# Patient Record
Sex: MALE | Race: BLACK OR AFRICAN AMERICAN | Employment: OTHER | URBAN - METROPOLITAN AREA
[De-identification: names, ages, dates, MRNs, and addresses within clinical notes are randomized per-mention and may not be internally consistent; named-entity substitution may affect disease eponyms.]

---

## 2019-07-05 ENCOUNTER — HOSPITAL ENCOUNTER (EMERGENCY)
Age: 84
Discharge: HOME | End: 2019-07-05
Attending: EMERGENCY MEDICINE
Payer: MEDICARE

## 2019-07-05 ENCOUNTER — APPOINTMENT (OUTPATIENT)
Dept: CT IMAGING | Age: 84
End: 2019-07-05
Payer: MEDICARE

## 2019-07-05 VITALS
HEART RATE: 71 BPM | WEIGHT: 174 LBS | SYSTOLIC BLOOD PRESSURE: 153 MMHG | TEMPERATURE: 98.1 F | RESPIRATION RATE: 19 BRPM | OXYGEN SATURATION: 97 % | DIASTOLIC BLOOD PRESSURE: 73 MMHG

## 2019-07-05 DIAGNOSIS — T67.5XXA HEAT EXHAUSTION, INITIAL ENCOUNTER: Primary | ICD-10-CM

## 2019-07-05 LAB
ALBUMIN SERPL-MCNC: 4 G/DL (ref 3.5–4.6)
ALP BLD-CCNC: 59 U/L (ref 35–104)
ALT SERPL-CCNC: 7 U/L (ref 0–41)
ANION GAP SERPL CALCULATED.3IONS-SCNC: 15 MEQ/L (ref 9–15)
AST SERPL-CCNC: 13 U/L (ref 0–40)
BASOPHILS ABSOLUTE: 0 K/UL (ref 0–0.2)
BASOPHILS RELATIVE PERCENT: 0.2 %
BILIRUB SERPL-MCNC: 0.3 MG/DL (ref 0.2–0.7)
BUN BLDV-MCNC: 15 MG/DL (ref 8–23)
CALCIUM SERPL-MCNC: 8.5 MG/DL (ref 8.5–9.9)
CHLORIDE BLD-SCNC: 104 MEQ/L (ref 95–107)
CO2: 22 MEQ/L (ref 20–31)
CREAT SERPL-MCNC: 1.16 MG/DL (ref 0.7–1.2)
EKG ATRIAL RATE: 71 BPM
EKG P AXIS: 56 DEGREES
EKG P-R INTERVAL: 206 MS
EKG Q-T INTERVAL: 418 MS
EKG QRS DURATION: 98 MS
EKG QTC CALCULATION (BAZETT): 454 MS
EKG R AXIS: 40 DEGREES
EKG T AXIS: 45 DEGREES
EKG VENTRICULAR RATE: 71 BPM
EOSINOPHILS ABSOLUTE: 0.3 K/UL (ref 0–0.7)
EOSINOPHILS RELATIVE PERCENT: 5.7 %
GFR AFRICAN AMERICAN: >60
GFR NON-AFRICAN AMERICAN: 59.7
GLOBULIN: 3.9 G/DL (ref 2.3–3.5)
GLUCOSE BLD-MCNC: 111 MG/DL (ref 70–99)
HCT VFR BLD CALC: 33.4 % (ref 42–52)
HEMOGLOBIN: 11.1 G/DL (ref 14–18)
INR BLD: 1.1
LYMPHOCYTES ABSOLUTE: 1.5 K/UL (ref 1–4.8)
LYMPHOCYTES RELATIVE PERCENT: 27.4 %
MCH RBC QN AUTO: 30.5 PG (ref 27–31.3)
MCHC RBC AUTO-ENTMCNC: 33.4 % (ref 33–37)
MCV RBC AUTO: 91.3 FL (ref 80–100)
MONOCYTES ABSOLUTE: 0.5 K/UL (ref 0.2–0.8)
MONOCYTES RELATIVE PERCENT: 9.6 %
NEUTROPHILS ABSOLUTE: 3.2 K/UL (ref 1.4–6.5)
NEUTROPHILS RELATIVE PERCENT: 57.1 %
PDW BLD-RTO: 14 % (ref 11.5–14.5)
PLATELET # BLD: 243 K/UL (ref 130–400)
POTASSIUM SERPL-SCNC: 3.6 MEQ/L (ref 3.4–4.9)
PROTHROMBIN TIME: 11.1 SEC (ref 9–11.5)
RBC # BLD: 3.66 M/UL (ref 4.7–6.1)
SODIUM BLD-SCNC: 141 MEQ/L (ref 135–144)
TOTAL PROTEIN: 7.9 G/DL (ref 6.3–8)
WBC # BLD: 5.7 K/UL (ref 4.8–10.8)

## 2019-07-05 PROCEDURE — 96375 TX/PRO/DX INJ NEW DRUG ADDON: CPT

## 2019-07-05 PROCEDURE — 85025 COMPLETE CBC W/AUTO DIFF WBC: CPT

## 2019-07-05 PROCEDURE — 85610 PROTHROMBIN TIME: CPT

## 2019-07-05 PROCEDURE — 99285 EMERGENCY DEPT VISIT HI MDM: CPT

## 2019-07-05 PROCEDURE — 36415 COLL VENOUS BLD VENIPUNCTURE: CPT

## 2019-07-05 PROCEDURE — 80053 COMPREHEN METABOLIC PANEL: CPT

## 2019-07-05 PROCEDURE — 6360000002 HC RX W HCPCS: Performed by: EMERGENCY MEDICINE

## 2019-07-05 PROCEDURE — 2580000003 HC RX 258: Performed by: EMERGENCY MEDICINE

## 2019-07-05 PROCEDURE — 96374 THER/PROPH/DIAG INJ IV PUSH: CPT

## 2019-07-05 PROCEDURE — 70450 CT HEAD/BRAIN W/O DYE: CPT

## 2019-07-05 PROCEDURE — 93005 ELECTROCARDIOGRAM TRACING: CPT | Performed by: EMERGENCY MEDICINE

## 2019-07-05 PROCEDURE — 93010 ELECTROCARDIOGRAM REPORT: CPT | Performed by: INTERNAL MEDICINE

## 2019-07-05 RX ORDER — LORAZEPAM 2 MG/ML
1 INJECTION INTRAMUSCULAR ONCE
Status: COMPLETED | OUTPATIENT
Start: 2019-07-05 | End: 2019-07-05

## 2019-07-05 RX ORDER — KETOROLAC TROMETHAMINE 15 MG/ML
15 INJECTION, SOLUTION INTRAMUSCULAR; INTRAVENOUS ONCE
Status: COMPLETED | OUTPATIENT
Start: 2019-07-05 | End: 2019-07-05

## 2019-07-05 RX ORDER — 0.9 % SODIUM CHLORIDE 0.9 %
1000 INTRAVENOUS SOLUTION INTRAVENOUS ONCE
Status: COMPLETED | OUTPATIENT
Start: 2019-07-05 | End: 2019-07-05

## 2019-07-05 RX ADMIN — SODIUM CHLORIDE 1000 ML: 9 INJECTION, SOLUTION INTRAVENOUS at 01:49

## 2019-07-05 RX ADMIN — KETOROLAC TROMETHAMINE 15 MG: 15 INJECTION, SOLUTION INTRAMUSCULAR; INTRAVENOUS at 01:49

## 2019-07-05 RX ADMIN — LORAZEPAM 1 MG: 2 INJECTION INTRAMUSCULAR; INTRAVENOUS at 01:49

## 2019-07-05 SDOH — HEALTH STABILITY: MENTAL HEALTH: HOW OFTEN DO YOU HAVE A DRINK CONTAINING ALCOHOL?: NEVER

## 2019-07-05 ASSESSMENT — ENCOUNTER SYMPTOMS
COUGH: 0
VOMITING: 0
SORE THROAT: 0
SHORTNESS OF BREATH: 0
WHEEZING: 0
ABDOMINAL DISTENTION: 0
CHEST TIGHTNESS: 0
PHOTOPHOBIA: 0
ABDOMINAL PAIN: 0
EYE DISCHARGE: 0

## 2019-07-05 ASSESSMENT — PAIN SCALES - GENERAL: PAINLEVEL_OUTOF10: 3

## 2019-07-05 NOTE — ED PROVIDER NOTES
COMPREHENSIVE METABOLIC PANEL - Abnormal; Notable for the following components:       Result Value    Glucose 111 (*)     GFR Non- 59.7 (*)     Globulin 3.9 (*)     All other components within normal limits   CBC WITH AUTO DIFFERENTIAL - Abnormal; Notable for the following components:    RBC 3.66 (*)     Hemoglobin 11.1 (*)     Hematocrit 33.4 (*)     All other components within normal limits   PROTIME-INR   URINE RT REFLEX TO CULTURE       All other labs were within normal range or not returned as of this dictation. EMERGENCY DEPARTMENT COURSE and DIFFERENTIAL DIAGNOSIS/MDM:   Vitals:    Vitals:    07/05/19 0017 07/05/19 0023 07/05/19 0130   BP:  (!) 184/90 (!) 161/83   Pulse:  80 65   Resp:  17 16   Temp:  98.1 °F (36.7 °C)    TempSrc:  Oral    SpO2:  99% 97%   Weight: 174 lb (78.9 kg)          MDM patient has had a completely normal neurologic exam while here. He has been appropriate according to his girlfriend. It appears that he had a level of heat exhaustion leading to the episode he had while out tonight. This was substantially longer time outside today and it was very hot humid today. His head CT was normal.  He is going to follow-up with his family doctor return here for any recurring symptoms. CONSULTS:  None    PROCEDURES:  Unless otherwise noted below, none     Procedures    FINAL IMPRESSION      1.  Heat exhaustion, initial encounter          DISPOSITION/PLAN   DISPOSITION Decision To Discharge 07/05/2019 02:56:14 AM      PATIENT REFERRED TO:  Family Doctor    In 3 days        DISCHARGE MEDICATIONS:  New Prescriptions    No medications on file          (Please note that portions of this note were completed with a voice recognition program.  Efforts were made to edit the dictations but occasionally words are mis-transcribed.)    Annabella Humphrey MD (electronically signed)  Attending Emergency Physician          Annabella Humphrey MD  07/05/19 [id number]

## 2020-11-30 ENCOUNTER — HOSPITAL ENCOUNTER (INPATIENT)
Age: 85
LOS: 3 days | Discharge: PSYCHIATRIC OR SUBSTANCE USE FACILITY | DRG: 057 | End: 2020-12-04
Attending: EMERGENCY MEDICINE | Admitting: INTERNAL MEDICINE
Payer: MEDICARE

## 2020-11-30 ENCOUNTER — APPOINTMENT (OUTPATIENT)
Dept: MRI IMAGING | Age: 85
DRG: 057 | End: 2020-11-30
Payer: MEDICARE

## 2020-11-30 ENCOUNTER — APPOINTMENT (OUTPATIENT)
Dept: GENERAL RADIOLOGY | Age: 85
DRG: 057 | End: 2020-11-30
Payer: MEDICARE

## 2020-11-30 ENCOUNTER — APPOINTMENT (OUTPATIENT)
Dept: CT IMAGING | Age: 85
DRG: 057 | End: 2020-11-30
Payer: MEDICARE

## 2020-11-30 PROBLEM — R41.82 AMS (ALTERED MENTAL STATUS): Status: ACTIVE | Noted: 2020-11-30

## 2020-11-30 LAB
ALBUMIN SERPL-MCNC: 4.5 G/DL (ref 3.5–4.6)
ALP BLD-CCNC: 57 U/L (ref 35–104)
ALT SERPL-CCNC: 7 U/L (ref 0–41)
AMPHETAMINE SCREEN, URINE: NORMAL
ANION GAP SERPL CALCULATED.3IONS-SCNC: 14 MEQ/L (ref 9–15)
ANION GAP SERPL CALCULATED.3IONS-SCNC: 17 MEQ/L (ref 9–15)
AST SERPL-CCNC: 18 U/L (ref 0–40)
BACTERIA: NEGATIVE /HPF
BARBITURATE SCREEN URINE: NORMAL
BASOPHILS ABSOLUTE: 0 K/UL (ref 0–0.2)
BASOPHILS ABSOLUTE: 0.1 K/UL (ref 0–0.2)
BASOPHILS RELATIVE PERCENT: 0.6 %
BASOPHILS RELATIVE PERCENT: 0.9 %
BENZODIAZEPINE SCREEN, URINE: NORMAL
BILIRUB SERPL-MCNC: 0.5 MG/DL (ref 0.2–0.7)
BILIRUBIN URINE: NEGATIVE
BLOOD, URINE: ABNORMAL
BUN BLDV-MCNC: 26 MG/DL (ref 8–23)
BUN BLDV-MCNC: 29 MG/DL (ref 8–23)
CALCIUM SERPL-MCNC: 8.7 MG/DL (ref 8.5–9.9)
CALCIUM SERPL-MCNC: 9.1 MG/DL (ref 8.5–9.9)
CANNABINOID SCREEN URINE: NORMAL
CHLORIDE BLD-SCNC: 106 MEQ/L (ref 95–107)
CHLORIDE BLD-SCNC: 108 MEQ/L (ref 95–107)
CHOLESTEROL, TOTAL: 175 MG/DL (ref 0–199)
CLARITY: CLEAR
CO2: 17 MEQ/L (ref 20–31)
CO2: 22 MEQ/L (ref 20–31)
COCAINE METABOLITE SCREEN URINE: NORMAL
COLOR: YELLOW
CREAT SERPL-MCNC: 1.01 MG/DL (ref 0.7–1.2)
CREAT SERPL-MCNC: 1.2 MG/DL (ref 0.7–1.2)
EOSINOPHILS ABSOLUTE: 0 K/UL (ref 0–0.7)
EOSINOPHILS ABSOLUTE: 0.1 K/UL (ref 0–0.7)
EOSINOPHILS RELATIVE PERCENT: 0.3 %
EOSINOPHILS RELATIVE PERCENT: 1.2 %
EPITHELIAL CELLS, UA: ABNORMAL /HPF (ref 0–5)
ETHANOL PERCENT: NORMAL G/DL
ETHANOL: <10 MG/DL (ref 0–0.08)
FOLATE: 11.1 NG/ML (ref 7.3–26.1)
GFR AFRICAN AMERICAN: >60
GFR AFRICAN AMERICAN: >60
GFR NON-AFRICAN AMERICAN: 57.2
GFR NON-AFRICAN AMERICAN: >60
GLOBULIN: 4 G/DL (ref 2.3–3.5)
GLUCOSE BLD-MCNC: 79 MG/DL (ref 70–99)
GLUCOSE BLD-MCNC: 85 MG/DL (ref 70–99)
GLUCOSE URINE: NEGATIVE MG/DL
HBA1C MFR BLD: 4.8 % (ref 4.8–5.9)
HCT VFR BLD CALC: 32.9 % (ref 42–52)
HCT VFR BLD CALC: 35.2 % (ref 42–52)
HDLC SERPL-MCNC: 64 MG/DL (ref 40–59)
HEMOGLOBIN: 10.7 G/DL (ref 14–18)
HEMOGLOBIN: 12.2 G/DL (ref 14–18)
HYALINE CASTS: ABNORMAL /HPF (ref 0–5)
KETONES, URINE: 15 MG/DL
LDL CHOLESTEROL CALCULATED: 102 MG/DL (ref 0–129)
LEUKOCYTE ESTERASE, URINE: NEGATIVE
LYMPHOCYTES ABSOLUTE: 1 K/UL (ref 1–4.8)
LYMPHOCYTES ABSOLUTE: 1.4 K/UL (ref 1–4.8)
LYMPHOCYTES RELATIVE PERCENT: 15.3 %
LYMPHOCYTES RELATIVE PERCENT: 18.8 %
Lab: NORMAL
MCH RBC QN AUTO: 30.6 PG (ref 27–31.3)
MCH RBC QN AUTO: 31.5 PG (ref 27–31.3)
MCHC RBC AUTO-ENTMCNC: 32.5 % (ref 33–37)
MCHC RBC AUTO-ENTMCNC: 34.6 % (ref 33–37)
MCV RBC AUTO: 90.8 FL (ref 80–100)
MCV RBC AUTO: 94.3 FL (ref 80–100)
METHADONE SCREEN, URINE: NORMAL
MONOCYTES ABSOLUTE: 0.4 K/UL (ref 0.2–0.8)
MONOCYTES ABSOLUTE: 0.7 K/UL (ref 0.2–0.8)
MONOCYTES RELATIVE PERCENT: 5.9 %
MONOCYTES RELATIVE PERCENT: 9 %
NEUTROPHILS ABSOLUTE: 5 K/UL (ref 1.4–6.5)
NEUTROPHILS ABSOLUTE: 5.2 K/UL (ref 1.4–6.5)
NEUTROPHILS RELATIVE PERCENT: 70.1 %
NEUTROPHILS RELATIVE PERCENT: 77.9 %
NITRITE, URINE: NEGATIVE
OPIATE SCREEN URINE: NORMAL
OXYCODONE URINE: NORMAL
PDW BLD-RTO: 13.9 % (ref 11.5–14.5)
PDW BLD-RTO: 13.9 % (ref 11.5–14.5)
PH UA: 5 (ref 5–9)
PHENCYCLIDINE SCREEN URINE: NORMAL
PLATELET # BLD: 264 K/UL (ref 130–400)
PLATELET # BLD: 286 K/UL (ref 130–400)
POTASSIUM SERPL-SCNC: 3.7 MEQ/L (ref 3.4–4.9)
POTASSIUM SERPL-SCNC: 3.7 MEQ/L (ref 3.4–4.9)
PROPOXYPHENE SCREEN: NORMAL
PROTEIN UA: 30 MG/DL
RBC # BLD: 3.49 M/UL (ref 4.7–6.1)
RBC # BLD: 3.88 M/UL (ref 4.7–6.1)
RBC UA: ABNORMAL /HPF (ref 0–5)
SODIUM BLD-SCNC: 142 MEQ/L (ref 135–144)
SODIUM BLD-SCNC: 142 MEQ/L (ref 135–144)
SPECIFIC GRAVITY UA: 1.02 (ref 1–1.03)
TOTAL PROTEIN: 8.5 G/DL (ref 6.3–8)
TRIGL SERPL-MCNC: 45 MG/DL (ref 0–150)
TROPONIN: <0.01 NG/ML (ref 0–0.01)
TSH SERPL DL<=0.05 MIU/L-ACNC: 1.57 UIU/ML (ref 0.44–3.86)
URINE REFLEX TO CULTURE: ABNORMAL
UROBILINOGEN, URINE: 1 E.U./DL
VITAMIN B-12: 255 PG/ML (ref 232–1245)
WBC # BLD: 6.4 K/UL (ref 4.8–10.8)
WBC # BLD: 7.5 K/UL (ref 4.8–10.8)
WBC UA: ABNORMAL /HPF (ref 0–5)

## 2020-11-30 PROCEDURE — 82607 VITAMIN B-12: CPT

## 2020-11-30 PROCEDURE — 70450 CT HEAD/BRAIN W/O DYE: CPT

## 2020-11-30 PROCEDURE — 2580000003 HC RX 258: Performed by: INTERNAL MEDICINE

## 2020-11-30 PROCEDURE — 85025 COMPLETE CBC W/AUTO DIFF WBC: CPT

## 2020-11-30 PROCEDURE — 82746 ASSAY OF FOLIC ACID SERUM: CPT

## 2020-11-30 PROCEDURE — 83036 HEMOGLOBIN GLYCOSYLATED A1C: CPT

## 2020-11-30 PROCEDURE — 96372 THER/PROPH/DIAG INJ SC/IM: CPT

## 2020-11-30 PROCEDURE — G0480 DRUG TEST DEF 1-7 CLASSES: HCPCS

## 2020-11-30 PROCEDURE — 36415 COLL VENOUS BLD VENIPUNCTURE: CPT

## 2020-11-30 PROCEDURE — G0378 HOSPITAL OBSERVATION PER HR: HCPCS

## 2020-11-30 PROCEDURE — 6360000002 HC RX W HCPCS: Performed by: NURSE PRACTITIONER

## 2020-11-30 PROCEDURE — 90792 PSYCH DIAG EVAL W/MED SRVCS: CPT | Performed by: PSYCHIATRY & NEUROLOGY

## 2020-11-30 PROCEDURE — 6360000002 HC RX W HCPCS

## 2020-11-30 PROCEDURE — 96375 TX/PRO/DX INJ NEW DRUG ADDON: CPT

## 2020-11-30 PROCEDURE — 80053 COMPREHEN METABOLIC PANEL: CPT

## 2020-11-30 PROCEDURE — 84484 ASSAY OF TROPONIN QUANT: CPT

## 2020-11-30 PROCEDURE — 95816 EEG AWAKE AND DROWSY: CPT

## 2020-11-30 PROCEDURE — 99203 OFFICE O/P NEW LOW 30 MIN: CPT | Performed by: PSYCHIATRY & NEUROLOGY

## 2020-11-30 PROCEDURE — 97166 OT EVAL MOD COMPLEX 45 MIN: CPT

## 2020-11-30 PROCEDURE — 80307 DRUG TEST PRSMV CHEM ANLYZR: CPT

## 2020-11-30 PROCEDURE — 96125 COGNITIVE TEST BY HC PRO: CPT

## 2020-11-30 PROCEDURE — 80061 LIPID PANEL: CPT

## 2020-11-30 PROCEDURE — 2500000003 HC RX 250 WO HCPCS: Performed by: EMERGENCY MEDICINE

## 2020-11-30 PROCEDURE — 97162 PT EVAL MOD COMPLEX 30 MIN: CPT

## 2020-11-30 PROCEDURE — 84443 ASSAY THYROID STIM HORMONE: CPT

## 2020-11-30 PROCEDURE — 70551 MRI BRAIN STEM W/O DYE: CPT

## 2020-11-30 PROCEDURE — 6370000000 HC RX 637 (ALT 250 FOR IP): Performed by: INTERNAL MEDICINE

## 2020-11-30 PROCEDURE — 99285 EMERGENCY DEPT VISIT HI MDM: CPT

## 2020-11-30 PROCEDURE — 96365 THER/PROPH/DIAG IV INF INIT: CPT

## 2020-11-30 PROCEDURE — 6360000002 HC RX W HCPCS: Performed by: INTERNAL MEDICINE

## 2020-11-30 PROCEDURE — 81001 URINALYSIS AUTO W/SCOPE: CPT

## 2020-11-30 PROCEDURE — 92610 EVALUATE SWALLOWING FUNCTION: CPT

## 2020-11-30 PROCEDURE — 92523 SPEECH SOUND LANG COMPREHEN: CPT

## 2020-11-30 PROCEDURE — 2580000003 HC RX 258: Performed by: EMERGENCY MEDICINE

## 2020-11-30 PROCEDURE — 71045 X-RAY EXAM CHEST 1 VIEW: CPT

## 2020-11-30 RX ORDER — LABETALOL HYDROCHLORIDE 5 MG/ML
20 INJECTION, SOLUTION INTRAVENOUS ONCE
Status: COMPLETED | OUTPATIENT
Start: 2020-11-30 | End: 2020-11-30

## 2020-11-30 RX ORDER — PROMETHAZINE HYDROCHLORIDE 12.5 MG/1
12.5 TABLET ORAL EVERY 6 HOURS PRN
Status: DISCONTINUED | OUTPATIENT
Start: 2020-11-30 | End: 2020-12-04 | Stop reason: HOSPADM

## 2020-11-30 RX ORDER — 0.9 % SODIUM CHLORIDE 0.9 %
1000 INTRAVENOUS SOLUTION INTRAVENOUS ONCE
Status: COMPLETED | OUTPATIENT
Start: 2020-11-30 | End: 2020-11-30

## 2020-11-30 RX ORDER — HALOPERIDOL 5 MG/ML
2 INJECTION INTRAMUSCULAR EVERY 6 HOURS PRN
Status: DISCONTINUED | OUTPATIENT
Start: 2020-11-30 | End: 2020-12-04 | Stop reason: HOSPADM

## 2020-11-30 RX ORDER — POLYETHYLENE GLYCOL 3350 17 G/17G
17 POWDER, FOR SOLUTION ORAL DAILY PRN
Status: DISCONTINUED | OUTPATIENT
Start: 2020-11-30 | End: 2020-12-04 | Stop reason: HOSPADM

## 2020-11-30 RX ORDER — LABETALOL HYDROCHLORIDE 5 MG/ML
10 INJECTION, SOLUTION INTRAVENOUS EVERY 6 HOURS PRN
Status: DISCONTINUED | OUTPATIENT
Start: 2020-11-30 | End: 2020-12-04 | Stop reason: HOSPADM

## 2020-11-30 RX ORDER — ACETAMINOPHEN 650 MG/1
650 SUPPOSITORY RECTAL EVERY 6 HOURS PRN
Status: DISCONTINUED | OUTPATIENT
Start: 2020-11-30 | End: 2020-12-04 | Stop reason: HOSPADM

## 2020-11-30 RX ORDER — RISPERIDONE 0.5 MG/1
0.25 TABLET, FILM COATED ORAL NIGHTLY
Status: DISCONTINUED | OUTPATIENT
Start: 2020-11-30 | End: 2020-12-04 | Stop reason: HOSPADM

## 2020-11-30 RX ORDER — DORZOLAMIDE HCL 20 MG/ML
1 SOLUTION/ DROPS OPHTHALMIC 3 TIMES DAILY
Status: DISCONTINUED | OUTPATIENT
Start: 2020-11-30 | End: 2020-12-04 | Stop reason: HOSPADM

## 2020-11-30 RX ORDER — SODIUM CHLORIDE 0.9 % (FLUSH) 0.9 %
10 SYRINGE (ML) INJECTION PRN
Status: DISCONTINUED | OUTPATIENT
Start: 2020-11-30 | End: 2020-12-04 | Stop reason: HOSPADM

## 2020-11-30 RX ORDER — ASPIRIN 81 MG/1
81 TABLET ORAL DAILY
Status: DISCONTINUED | OUTPATIENT
Start: 2020-11-30 | End: 2020-12-04 | Stop reason: HOSPADM

## 2020-11-30 RX ORDER — SODIUM CHLORIDE 0.9 % (FLUSH) 0.9 %
3 SYRINGE (ML) INJECTION EVERY 8 HOURS
Status: DISCONTINUED | OUTPATIENT
Start: 2020-11-30 | End: 2020-12-04 | Stop reason: HOSPADM

## 2020-11-30 RX ORDER — CLONIDINE HYDROCHLORIDE 0.1 MG/1
0.2 TABLET ORAL ONCE
Status: DISCONTINUED | OUTPATIENT
Start: 2020-11-30 | End: 2020-11-30

## 2020-11-30 RX ORDER — SODIUM CHLORIDE 0.9 % (FLUSH) 0.9 %
10 SYRINGE (ML) INJECTION EVERY 12 HOURS SCHEDULED
Status: DISCONTINUED | OUTPATIENT
Start: 2020-11-30 | End: 2020-12-04 | Stop reason: HOSPADM

## 2020-11-30 RX ORDER — THIAMINE HYDROCHLORIDE 100 MG/ML
500 INJECTION, SOLUTION INTRAMUSCULAR; INTRAVENOUS DAILY
Status: DISCONTINUED | OUTPATIENT
Start: 2020-11-30 | End: 2020-11-30 | Stop reason: ALTCHOICE

## 2020-11-30 RX ORDER — TIMOLOL MALEATE 5 MG/ML
1 SOLUTION/ DROPS OPHTHALMIC 2 TIMES DAILY
Status: DISCONTINUED | OUTPATIENT
Start: 2020-11-30 | End: 2020-12-04 | Stop reason: HOSPADM

## 2020-11-30 RX ORDER — LOSARTAN POTASSIUM 50 MG/1
50 TABLET ORAL DAILY
Status: DISCONTINUED | OUTPATIENT
Start: 2020-11-30 | End: 2020-12-04 | Stop reason: HOSPADM

## 2020-11-30 RX ORDER — ONDANSETRON 2 MG/ML
4 INJECTION INTRAMUSCULAR; INTRAVENOUS EVERY 6 HOURS PRN
Status: DISCONTINUED | OUTPATIENT
Start: 2020-11-30 | End: 2020-12-04 | Stop reason: HOSPADM

## 2020-11-30 RX ORDER — ROSUVASTATIN CALCIUM 20 MG/1
20 TABLET, COATED ORAL NIGHTLY
Status: DISCONTINUED | OUTPATIENT
Start: 2020-11-30 | End: 2020-12-04 | Stop reason: HOSPADM

## 2020-11-30 RX ORDER — LORAZEPAM 2 MG/ML
1 INJECTION INTRAMUSCULAR ONCE
Status: COMPLETED | OUTPATIENT
Start: 2020-11-30 | End: 2020-11-30

## 2020-11-30 RX ORDER — ACETAMINOPHEN 325 MG/1
650 TABLET ORAL EVERY 6 HOURS PRN
Status: DISCONTINUED | OUTPATIENT
Start: 2020-11-30 | End: 2020-12-04 | Stop reason: HOSPADM

## 2020-11-30 RX ORDER — HALOPERIDOL 5 MG/ML
INJECTION INTRAMUSCULAR
Status: COMPLETED
Start: 2020-11-30 | End: 2020-11-30

## 2020-11-30 RX ADMIN — ASPIRIN 81 MG: 81 TABLET, COATED ORAL at 13:29

## 2020-11-30 RX ADMIN — Medication 10 ML: at 13:30

## 2020-11-30 RX ADMIN — Medication 3 ML: at 02:39

## 2020-11-30 RX ADMIN — LABETALOL HYDROCHLORIDE 20 MG: 5 INJECTION, SOLUTION INTRAVENOUS at 04:06

## 2020-11-30 RX ADMIN — LORAZEPAM 1 MG: 2 INJECTION INTRAMUSCULAR; INTRAVENOUS at 16:03

## 2020-11-30 RX ADMIN — DORZOLAMIDE HYDROCHLORIDE 1 DROP: 20 SOLUTION/ DROPS OPHTHALMIC at 13:29

## 2020-11-30 RX ADMIN — HALOPERIDOL LACTATE 2 MG: 5 INJECTION, SOLUTION INTRAMUSCULAR at 17:34

## 2020-11-30 RX ADMIN — SODIUM CHLORIDE 1000 ML: 9 INJECTION, SOLUTION INTRAVENOUS at 02:38

## 2020-11-30 RX ADMIN — THIAMINE HYDROCHLORIDE 500 MG: 100 INJECTION, SOLUTION INTRAMUSCULAR; INTRAVENOUS at 13:30

## 2020-11-30 RX ADMIN — LOSARTAN POTASSIUM 50 MG: 50 TABLET, FILM COATED ORAL at 13:29

## 2020-11-30 RX ADMIN — TIMOLOL MALEATE 1 DROP: 5 SOLUTION/ DROPS OPHTHALMIC at 13:29

## 2020-11-30 ASSESSMENT — ENCOUNTER SYMPTOMS
SINUS PAIN: 0
SHORTNESS OF BREATH: 0
ABDOMINAL DISTENTION: 0
WHEEZING: 0
CHEST TIGHTNESS: 0
EYES NEGATIVE: 1
VOMITING: 0
BACK PAIN: 0
DIARRHEA: 0
COUGH: 0
BLOOD IN STOOL: 0
COLOR CHANGE: 0
GASTROINTESTINAL NEGATIVE: 1
EYE DISCHARGE: 0
RESPIRATORY NEGATIVE: 1
TROUBLE SWALLOWING: 0
PHOTOPHOBIA: 0
ABDOMINAL PAIN: 0
SORE THROAT: 0
NAUSEA: 0
CHOKING: 0
EYE PAIN: 0

## 2020-11-30 ASSESSMENT — PAIN SCALES - GENERAL
PAINLEVEL_OUTOF10: 0

## 2020-11-30 NOTE — PROGRESS NOTES
Department of Internal Medicine  General Internal Medicine  Attending Progress Note      SUBJECTIVE:  Pt seen and examined. Got acutely confused this AM requiring nursing manager to calm patient. Upon exam, patient much more calm and cooperative. No complaints. Agreeable to MRI. OBJECTIVE      Medications    Current Facility-Administered Medications: [COMPLETED] Saline lock IV, , , Continuous **AND** sodium chloride flush 0.9 % injection 3 mL, 3 mL, Intravenous, Q8H  sodium chloride flush 0.9 % injection 10 mL, 10 mL, Intravenous, 2 times per day  sodium chloride flush 0.9 % injection 10 mL, 10 mL, Intravenous, PRN  acetaminophen (TYLENOL) tablet 650 mg, 650 mg, Oral, Q6H PRN **OR** acetaminophen (TYLENOL) suppository 650 mg, 650 mg, Rectal, Q6H PRN  polyethylene glycol (GLYCOLAX) packet 17 g, 17 g, Oral, Daily PRN  promethazine (PHENERGAN) tablet 12.5 mg, 12.5 mg, Oral, Q6H PRN **OR** ondansetron (ZOFRAN) injection 4 mg, 4 mg, Intravenous, Q6H PRN  enoxaparin (LOVENOX) injection 40 mg, 40 mg, Subcutaneous, Daily  labetalol (NORMODYNE;TRANDATE) injection 10 mg, 10 mg, Intravenous, Q6H PRN  losartan (COZAAR) tablet 50 mg, 50 mg, Oral, Daily  aspirin EC tablet 81 mg, 81 mg, Oral, Daily  rosuvastatin (CRESTOR) tablet 20 mg, 20 mg, Oral, Nightly  thiamine (B-1) 500 mg in sodium chloride 0.9 % 100 mL IVPB, 500 mg, Intravenous, Daily  dorzolamide (TRUSOPT) 2 % ophthalmic solution 1 drop, 1 drop, Both Eyes, TID  timolol (TIMOPTIC) 0.5 % ophthalmic solution 1 drop, 1 drop, Both Eyes, BID  LORazepam (ATIVAN) injection 1 mg, 1 mg, Intravenous, Once  Physical    VITALS:  BP (!) 169/67   Pulse 78   Temp 97.5 °F (36.4 °C) (Oral)   Resp 18   Ht 5' 9\" (1.753 m)   Wt 160 lb (72.6 kg)   SpO2 100%   BMI 23.63 kg/m²   Constitutional: Oriented only to self.  Sitting in chair comfortably  Head: Normocephalic, atraumatic  Eyes: EOMI, PERRLA  ENT: moist mucous membranes  Neck: neck supple, trachea midline  Lungs: Good inspiratory effort, CTABL, no wheeze, no rhonchi, no rales  Heart: RRR, normal S1 and S2  GI: Soft, non-distended, non tender, no guarding, no rebound, +BS  MSK: no edema noted  Skin: warm, dry  Psych: appropriate affect     Data    CBC:   Lab Results   Component Value Date    WBC 6.4 11/30/2020    RBC 3.49 11/30/2020    HGB 10.7 11/30/2020    HCT 32.9 11/30/2020    MCV 94.3 11/30/2020    MCH 30.6 11/30/2020    MCHC 32.5 11/30/2020    RDW 13.9 11/30/2020     11/30/2020     CMP:    Lab Results   Component Value Date     11/30/2020    K 3.7 11/30/2020     11/30/2020    CO2 17 11/30/2020    BUN 26 11/30/2020    CREATININE 1.01 11/30/2020    GFRAA >60.0 11/30/2020    LABGLOM >60.0 11/30/2020    GLUCOSE 79 11/30/2020    PROT 8.5 11/30/2020    LABALBU 4.5 11/30/2020    CALCIUM 8.7 11/30/2020    BILITOT 0.5 11/30/2020    ALKPHOS 57 11/30/2020    AST 18 11/30/2020    ALT 7 11/30/2020       ASSESSMENT AND PLAN      # Encephalopathy - possibly related to undiagnosed dementia  - CT head negative  - pt oriented only to self  - neuro consulted- MRI ordered  - continuing DAPT, thiamine  - PT/OT - will likely need placement    # HTN  - cont home meds    DVT: lovenox    Disposition: MRI today. PT/OT. Will likely need placement as patient unable to make sound/safe decisions.     Chelsea Collado, DO  Internal Medicine

## 2020-11-30 NOTE — PLAN OF CARE
Problem: Skin Integrity:  Goal: Will show no infection signs and symptoms  Description: Will show no infection signs and symptoms  Outcome: Ongoing  Goal: Absence of new skin breakdown  Description: Absence of new skin breakdown  Outcome: Ongoing     Problem: Falls - Risk of:  Goal: Will remain free from falls  Description: Will remain free from falls  Outcome: Met This Shift  Goal: Absence of physical injury  Description: Absence of physical injury  Outcome: Met This Shift

## 2020-11-30 NOTE — ED NOTES
Patient provided warm blankets. No other needs expressed at this time.      Flores Dee RN  11/30/20 8540

## 2020-11-30 NOTE — CONSULTS
Subjective:      Patient ID: Erick York is a 80 y.o. male who presents today for mental status changes. HPI 80-year-old right-handed gentleman was brought to the emergency room as he was found around his  camper in the parking lot disoriented. Patient has no recall of events. Patient reports he was found in the store and he was hungry and was trying to get food. He thinks it is may and in the emergency room he thought it was April. He has no family and he moves around on his own. He reports his friends help him pay his bills and has very poor insight to his whole issues. He has a son who lives in [place] reportedly talks to him on occasions. He is not even aware he is in the hospital.  Reports that he has some difficulty with walking though he feels that his joints hurt    Review of Systems   Constitutional: Negative for fever. HENT: Negative for ear pain, tinnitus and trouble swallowing. Eyes: Negative for photophobia and visual disturbance. Respiratory: Negative for choking and shortness of breath. Cardiovascular: Negative for chest pain and palpitations. Gastrointestinal: Negative for nausea and vomiting. Musculoskeletal: Positive for gait problem. Negative for back pain, joint swelling, myalgias, neck pain and neck stiffness. Skin: Negative for color change. Allergic/Immunologic: Negative for food allergies. Neurological: Negative for dizziness, tremors, seizures, syncope, facial asymmetry, speech difficulty, weakness, light-headedness, numbness and headaches. Psychiatric/Behavioral: Negative for behavioral problems, confusion, hallucinations and sleep disturbance. In addition to the above patient is disoriented and has significant memory issues    Past Medical History:   Diagnosis Date    Hypertension      History reviewed. No pertinent surgical history.   Social History     Socioeconomic History    Marital status: Single     Spouse name: Not on file    Number of children: Not on file    Years of education: Not on file    Highest education level: Not on file   Occupational History    Not on file   Social Needs    Financial resource strain: Not on file    Food insecurity     Worry: Not on file     Inability: Not on file    Transportation needs     Medical: Not on file     Non-medical: Not on file   Tobacco Use    Smoking status: Never Smoker    Smokeless tobacco: Never Used   Substance and Sexual Activity    Alcohol use: Not Currently     Frequency: Never    Drug use: Never    Sexual activity: Not on file   Lifestyle    Physical activity     Days per week: Not on file     Minutes per session: Not on file    Stress: Not on file   Relationships    Social connections     Talks on phone: Not on file     Gets together: Not on file     Attends Latter-day service: Not on file     Active member of club or organization: Not on file     Attends meetings of clubs or organizations: Not on file     Relationship status: Not on file    Intimate partner violence     Fear of current or ex partner: Not on file     Emotionally abused: Not on file     Physically abused: Not on file     Forced sexual activity: Not on file   Other Topics Concern    Not on file   Social History Narrative    Not on file     History reviewed. No pertinent family history.   No Known Allergies  Current Facility-Administered Medications   Medication Dose Route Frequency Provider Last Rate Last Dose    sodium chloride flush 0.9 % injection 3 mL  3 mL Intravenous Q8H Crystal Hankins DO   3 mL at 11/30/20 0239    sodium chloride flush 0.9 % injection 10 mL  10 mL Intravenous 2 times per day Yenifer Gains Sedar, DO        sodium chloride flush 0.9 % injection 10 mL  10 mL Intravenous PRN Yenifer Gains Sedar, DO        acetaminophen (TYLENOL) tablet 650 mg  650 mg Oral Q6H PRN Yenifer Gains Sedar, DO        Or    acetaminophen (TYLENOL) suppository 650 mg  650 mg Rectal Q6H PRN Yenifer Gains Sedar, DO        polyethylene glycol (GLYCOLAX) packet 17 g  17 g Oral Daily PRN Sydelle Forget Sedar, DO        promethazine (PHENERGAN) tablet 12.5 mg  12.5 mg Oral Q6H PRN Sydelle Forget Sedar, DO        Or    ondansetron (ZOFRAN) injection 4 mg  4 mg Intravenous Q6H PRN Sydelle Forget Sedar, DO        enoxaparin (LOVENOX) injection 40 mg  40 mg Subcutaneous Daily Sydelle Forget Sedar, DO        labetalol (NORMODYNE;TRANDATE) injection 10 mg  10 mg Intravenous Q6H PRN Sydelle Forget Sedar, DO        losartan (COZAAR) tablet 50 mg  50 mg Oral Daily Sydelle Forget Sedar, DO        aspirin EC tablet 81 mg  81 mg Oral Daily Raymundo D Sedar, DO        rosuvastatin (CRESTOR) tablet 20 mg  20 mg Oral Nightly Sydelle Forget Sedar, DO        thiamine (B-1) 500 mg in sodium chloride 0.9 % 100 mL IVPB  500 mg Intravenous Daily Laruth Jossie D Sedar, DO        dorzolamide (TRUSOPT) 2 % ophthalmic solution 1 drop  1 drop Both Eyes TID Sydelle Forget Sedar, DO        timolol (TIMOPTIC) 0.5 % ophthalmic solution 1 drop  1 drop Both Eyes BID Sydelle Forget Sedar, DO        LORazepam (ATIVAN) injection 1 mg  1 mg Intravenous Once Anne Marie Navarro, APRN - CNP            Objective:   BP (!) 169/67   Pulse 78   Temp 97.5 °F (36.4 °C) (Oral)   Resp 18   Ht 5' 9\" (1.753 m)   Wt 160 lb (72.6 kg)   SpO2 100%   BMI 23.63 kg/m²     Physical Exam  Vitals signs reviewed. Eyes:      Pupils: Pupils are equal, round, and reactive to light. Neck:      Musculoskeletal: Normal range of motion. Cardiovascular:      Rate and Rhythm: Normal rate and regular rhythm. Heart sounds: No murmur. Pulmonary:      Effort: Pulmonary effort is normal.      Breath sounds: Normal breath sounds. Abdominal:      General: Bowel sounds are normal.   Musculoskeletal: Normal range of motion. Skin:     General: Skin is warm. Neurological:      Mental Status: He is alert. He is disoriented. Cranial Nerves: No cranial nerve deficit. Sensory: No sensory deficit. Motor: No abnormal muscle tone.       Coordination: Coordination normal. Deep Tendon Reflexes: Reflexes are normal and symmetric. Babinski sign absent on the right side. Babinski sign absent on the left side. Comments: Mild ataxia with suggestion  of areflexia in the lower extremity   Psychiatric:         Mood and Affect: Mood normal.         Ct Head Wo Contrast    Result Date: 11/30/2020  CT Brain Contrast medium:  Not utilized. History: Altered mental status Comparison:  July 5, 2019 Findings: Extra-axial spaces:  Normal. Intracranial hemorrhage:  None. Ventricular system: Ventricles mildly enlarged. Sulci mildly prominent. Basal Cisterns:  Normal. Cerebral Parenchyma: Bilateral symmetric periventricular areas decreased attenuation. Midline Shift:  None. Cerebellum:  Normal.  Paranasal sinuses and mastoid air cells: Mucosal thickening bilateral maxillary sinuses, right greater than left. Visualized Orbits:  Normal.     Impression: Bilateral maxillary sinusitis. All CT scans at this facility use dose modulation, iterative reconstruction, and/or weight based dosing when appropriate to reduce radiation dose to as low as reasonably achievable. Xr Chest Portable    Result Date: 11/30/2020  EXAMINATION: XR CHEST PORTABLE CLINICAL HISTORY: ALTERED MENTAL STATUS COMPARISONS: None available. FINDINGS: Osseous structures intact. Cardiopericardial silhouette normal. Pulmonary vasculature normal. Ill-defined areas of increased opacity are found in the left lower lung, right lung is clear. LEFT LOWER LUNG SUBSEGMENTAL ATELECTASIS/PNEUMONIA.       Lab Results   Component Value Date    WBC 6.4 11/30/2020    RBC 3.49 11/30/2020    HGB 10.7 11/30/2020    HCT 32.9 11/30/2020    MCV 94.3 11/30/2020    MCH 30.6 11/30/2020    MCHC 32.5 11/30/2020    RDW 13.9 11/30/2020     11/30/2020     Lab Results   Component Value Date     11/30/2020    K 3.7 11/30/2020     11/30/2020    CO2 17 11/30/2020    BUN 26 11/30/2020    CREATININE 1.01 11/30/2020    GFRAA >60.0 11/30/2020 LABGLOM >60.0 11/30/2020    GLUCOSE 79 11/30/2020    PROT 8.5 11/30/2020    LABALBU 4.5 11/30/2020    CALCIUM 8.7 11/30/2020    BILITOT 0.5 11/30/2020    ALKPHOS 57 11/30/2020    AST 18 11/30/2020    ALT 7 11/30/2020     Lab Results   Component Value Date    PROTIME 11.1 07/05/2019    INR 1.1 07/05/2019     Lab Results   Component Value Date    ANLVVEJR31 255 11/30/2020    FOLATE 11.1 11/30/2020     Lab Results   Component Value Date    TRIG 45 11/30/2020    HDL 64 11/30/2020    LDLCALC 102 11/30/2020     Lab Results   Component Value Date    LABAMPH Neg 11/30/2020    BARBSCNU Neg 11/30/2020    LABBENZ Neg 11/30/2020    LABMETH Neg 11/30/2020    OPIATESCREENURINE Neg 11/30/2020    PHENCYCLIDINESCREENURINE Neg 11/30/2020    ETOH <10 11/30/2020     No results found for: LITHIUM, DILFRTOT, VALPROATE    Assessment:   Dementia from the clinical examination and his affect and evaluations. Details of his functional status is very difficult to certain as we do not have a witness account and there is no family members or friends we can reach at this time. We will evaluate him for underlying cerebrovascular disease and all other metabolic dysfunctions that can cause memory issues and if all this does not show anything significant that this gentleman truly has Alzheimer's dementia. If he is living alone he is likely require placement. Will obtain evaluation for treatable causes of dementia as well. And try to find his son for further input or a friend who can give us some insight to his neurological status. Ian Mata MD, Beny Grey, American Board of Psychiatry & Neurology  Board Certified in Vascular Neurology  Board Certified in Neuromuscular Medicine  Certified in [facility]:

## 2020-11-30 NOTE — CONSULTS
[address] [place] [address] Department of Psychiatry  Behavioral Health Consult    REASON FOR CONSULT: Altered mental status    CONSULTING PHYSICIAN: Dr. Alma Rodriguez    History obtained from: Patient    HISTORY OF PRESENT ILLNESS:     The patient is a 80 y.o. male living alone who presents with acute altered mental status after being found in the [employer] grocery store in [place]. By police it was later determined that the patient had a camper parked in the parking lot and he has been confused further information is not able to be obtained at this time. During the interview patient is alert and oriented x1. He appeared irritable and labile. Patient is unable to find any meaningful information. He appeared quiet perplexed about his current situation. He informed that he is [place] and later on changed his hometown to be in [facility]. He does not have any family members around. Patient is not agitated but clearly confused with poor self-care and hygiene    The patient is not currently receiving care for the above psychiatric illness. Psychiatric Review of Systems       Depression: Denies     Elisabeth or Hypomania:  no     Panic Attacks:  no     Phobias:  no     Obsessions and Compulsions:  no     PTSD : no     Hallucinations:  no     Delusions:  yes      Substance Abuse History:  Unable to elicit although was negative for drugs or alcohol on admission    Past Psychiatric History:  Unable to elicit any past psychiatric details    Past Medical History:        Diagnosis Date    Hypertension        Past Surgical History:    History reviewed. No pertinent surgical history. Medications Prior to Admission:   No medications prior to admission. Allergies:  Patient has no known allergies. FAMILY/SOCIAL HISTORY:  History reviewed. No pertinent family history.   Social History     Socioeconomic History    Marital status: Single     Spouse name: Not on file    Number of children: Not on file    Years of education: Not on file    Highest education level: Not on file   Occupational History    Not on file   Social Needs    Financial resource strain: Not on file    Food insecurity     Worry: Not on file     Inability: Not on file    Transportation needs     Medical: Not on file     Non-medical: Not on file   Tobacco Use    Smoking status: Never Smoker    Smokeless tobacco: Never Used   Substance and Sexual Activity    Alcohol use: Not Currently     Frequency: Never    Drug use: Never    Sexual activity: Not on file   Lifestyle    Physical activity     Days per week: Not on file     Minutes per session: Not on file    Stress: Not on file   Relationships    Social connections     Talks on phone: Not on file     Gets together: Not on file     Attends Jain service: Not on file     Active member of club or organization: Not on file     Attends meetings of clubs or organizations: Not on file     Relationship status: Not on file    Intimate partner violence     Fear of current or ex partner: Not on file     Emotionally abused: Not on file     Physically abused: Not on file     Forced sexual activity: Not on file   Other Topics Concern    Not on file   Social History Narrative    Not on file       REVIEW OF SYSTEMS    Constitutional: [] fever  [] chills  [] weight loss  []weakness [] Other:  Eyes:  [] photophobia  [] discharge [] acuity change   [] Diplopia   [] Other:  HENT:  [] sore throat  [] ear pain [] Tinnitus   [] Other  Respiratory:  [] Cough  [] Shortness of breath   [] Sputum   [] Other:   Cardiac: []Chest pain   []Palpitations []Edema  []PND  [] Other:  GI:  []Abdominal pain   []Nausea  []Vomiting  []Diarrhea  [] Other:  :  [] Dysuria   []Frequency  []Hematuria  []Discharge  [] Other:  Possible Pregnancy: []Yes   []No   LMP:   Musculoskeletal:  []Back pain  []Neck pain  []Recent Injury   Skin:  []Rash  [] Itching  [] Other:  Neurologic:  [] Headache  [] Focal weakness  [] Sensory changes []Other:  Endocrine: [] Polyuria  [] Polydipsia  [] Hair Loss  [] Other:  Lymphatic:   [] Swollen glands   Psychiatric:  As per HPI      All other systems negative except as marked or mentioned/indicated in the HPI. Magnolia Aram      PHYSICAL EXAM:  Vitals:  BP (!) 169/67   Pulse 78   Temp 97.5 °F (36.4 °C) (Oral)   Resp 18   Ht 5' 9\" (1.753 m)   Wt 160 lb (72.6 kg)   SpO2 100%   BMI 23.63 kg/m²      Neuro Exam:   Muscle Strength & Tone: full ROM  Gait: normal gait   Involuntary Movements: No    Mental Status Examination:    Level of consciousness:  awake   Appearance:  ill-appearing  Behavior/Motor:  psychomotor retardation  Attitude toward examiner:  evasive  Speech:  slow   Mood: anxious  Affect:  flat  Thought processes:  slow   Thought content:  Delusions:  paranoid  Cognition:  disoriented   Concentration poor  Memory unable to assess  Mini Mental Status not completed because   Insight poor   Judgement poor   Fund of Knowledge limited      DIAGNOSIS:    Delirium-acute, unclear etiology  Rule out underlying dementia          RISK ASSESSMENT: High risk of neglect        LABS: REVIEWED TODAY:  Recent Labs     11/30/20  0045 11/30/20  0545   WBC 7.5 6.4   HGB 12.2* 10.7*    264     Recent Labs     11/30/20 0045 11/30/20  0545    142   K 3.7 3.7    108*   CO2 22 17*   BUN 29* 26*   CREATININE 1.20 1.01   GLUCOSE 85 79     Recent Labs     11/30/20  0045   BILITOT 0.5   ALKPHOS 57   AST 18   ALT 7     Lab Results   Component Value Date    LABAMPH Neg 11/30/2020    BARBSCNU Neg 11/30/2020    LABBENZ Neg 11/30/2020    LABMETH Neg 11/30/2020    OPIATESCREENURINE Neg 11/30/2020    PHENCYCLIDINESCREENURINE Neg 11/30/2020    ETOH <10 11/30/2020     Lab Results   Component Value Date    TSH 1.570 11/30/2020     No results found for: LITHIUM  No results found for: VALPROATE, CBMZ  No results found for: LITHIUM, VALPROATE    FURTHER LABS ORDERED :      Radiology   Ct Head Wo Contrast    Result Date: 11/30/2020  CT Brain Contrast medium:  Not utilized. History: Altered mental status Comparison:  July 5, 2019 Findings: Extra-axial spaces:  Normal. Intracranial hemorrhage:  None. Ventricular system: Ventricles mildly enlarged. Sulci mildly prominent. Basal Cisterns:  Normal. Cerebral Parenchyma: Bilateral symmetric periventricular areas decreased attenuation. Midline Shift:  None. Cerebellum:  Normal.  Paranasal sinuses and mastoid air cells: Mucosal thickening bilateral maxillary sinuses, right greater than left. Visualized Orbits:  Normal.     Impression: Bilateral maxillary sinusitis. All CT scans at this facility use dose modulation, iterative reconstruction, and/or weight based dosing when appropriate to reduce radiation dose to as low as reasonably achievable. Xr Chest Portable    Result Date: 11/30/2020  EXAMINATION: XR CHEST PORTABLE CLINICAL HISTORY: ALTERED MENTAL STATUS COMPARISONS: None available. FINDINGS: Osseous structures intact. Cardiopericardial silhouette normal. Pulmonary vasculature normal. Ill-defined areas of increased opacity are found in the left lower lung, right lung is clear. LEFT LOWER LUNG SUBSEGMENTAL ATELECTASIS/PNEUMONIA.      EKG: TRACING REVIEWED    RECOMMENDATIONS    Risk Management:  routine:  no special precautions necessary    Medications: As needed Risperdal as needed for any agitation or confusion  Neuro work-up  We will continue to follow  Discussed with the treating physician/ team about the patient and treatment plan  Reviewed the chart    Discussed with the patient risk, benefit, alternative and common side effects for the  proposed medication treatment. Patient is consenting to the treatment. Thanks for the consult. Please call me if needed.     Electronically signed by Kera King MD on 11/30/2020 at 4:18 PM

## 2020-11-30 NOTE — PROGRESS NOTES
[facility]   Facility/Department: [facility]  Speech Language Pathology  Clinical Bedside Swallow Evaluation    NAME:Onofre Blount Asa  : 1933 (80 y.o.)   MRN: [id number]  ROOM: [place] [address]  ADMISSION DATE: 2020  PATIENT DIAGNOSIS(ES): AMS (altered mental status) [R41.82]  Chief Complaint   Patient presents with    Altered Mental Status     Pt found in  camper in parking lot; lost     Patient Active Problem List    Diagnosis Date Noted    AMS (altered mental status) 2020     Past Medical History:   Diagnosis Date    Hypertension      History reviewed. No pertinent surgical history. No Known Allergies    DATE ONSET: 20    Date of Evaluation: 2020   Evaluating Therapist: BRUNO Augustin    Recommended Diet and Intervention  Diet Solids Recommendation: Regular  Liquid Consistency Recommendation: Thin  Recommended Form of Meds: PO     Therapeutic Interventions: Diet tolerance monitoring    Compensatory Swallowing Strategies  Compensatory Swallowing Strategies: Small bites/sips; Alternate solids and liquids;Upright as possible for all oral intake    Reason for Referral  Waldo Vora was referred for a bedside swallow evaluation to assess the efficiency of his swallow function, identify signs and symptoms of aspiration and make recommendations regarding safe dietary consistencies, effective compensatory strategies, and safe eating environment. General  Chart Reviewed: Yes  Subjective  Subjective: Pt was confused, but cooperative and pleasant. Behavior/Cognition: Alert; Cooperative;Confused  Respiratory Status: Room air  O2 Device: None (Room air)  Communication Observation: Functional  Follows Directions: Simple  Dentition: Dentures top;Edentulous(Edentulous on bottom.)  Patient Positioning: Upright in bed  Baseline Vocal Quality: Normal  Prior Dysphagia History: None  Consistencies Administered: Dysphagia Soft and Bite-Sized (Dysphagia III); Reg solid; Thin - cup;Thin - straw    Current Diet level:  Current Diet : Regular  Current Liquid Diet : Thin    Oral Motor Deficits  Oral/Motor  Oral Motor: Within functional limits    Oral Phase Dysfunction  Oral Phase  Oral Phase: Exceptions  Oral Phase Dysfunction  Impaired Mastication: Reg Solid  Decreased Anterior to Posterior Transit: Reg solid  Oral Phase  Oral Phase - Comment: Mild oral dysphagia was noted     Indicators of Pharyngeal Phase Dysfunction   Pharyngeal Phase  Pharyngeal Phase: WFL  Pharyngeal Phase   Pharyngeal: Pharyngeal phase of the swallow mechanism appeared WFL    Impression  Dysphagia Diagnosis: Mild oral stage dysphagia  Dysphagia Impression : Mild oral dysphagia was noted including increased mastication time and increased oral transit time with regular solids. Pt is edentulous on the bottom,. which negatively impacted the pt's ability to masticate. No overt s/s of aspiration was noted with all po trials. Pt's vocal quality remained clear throughout the eval.  Dysphagia Outcome Severity Scale: Level 6: Within functional limits/Modified independence     Treatment Plan  Requires SLP Intervention: Yes  Duration/Frequency of Treatment: 1x f/u for meal monitor  D/C Recommendations:  To be determined       Treatment/Goals  Short-term Goals  Timeframe for Short-term Goals: 1 week  Goal 1: Pt will tolerate regular solids with thin liquids without overt s/s of aspiration with 100% accuracy  Long-term Goals  Timeframe for Long-term Goals: 1-2 weeks  Goal 1: Pt will tolerate lrd without overt s/s of aspiration    Prognosis  Prognosis  Prognosis for safe diet advancement: good  Individuals consulted  Consulted and agree with results and recommendations: Patient;RN(Lety)    Education  Patient Education: Pt was educated on BSE results  Patient Education Response: Verbalizes understanding;Needs reinforcement  Safety Devices in place: Yes  Type of devices: Bed alarm in place;Call light within reach    Pain Assessment:  Initial Assessment:  Patient denies pain. Re-assessment:  Patient denies pain.        Therapy Time  SLP Individual Minutes  Time In: 6555  Time Out: 5928  Minutes: 13              Signature: Electronically signed by BRUNO Montero on 11/30/2020 at 10:00 AM

## 2020-11-30 NOTE — H&P
Hospital Medicine  History and Physical    Patient:  Sahra Bray  MRN: [id number]    CHIEF COMPLAINT:    Chief Complaint   Patient presents with    Altered Mental Status     Pt found in RV camper in parking lot; lost       History Obtained From:  patient, electronic medical record  Primary Care Physician: No primary care provider on file. HISTORY OF PRESENT ILLNESS:   The patient is a 80 y.o. male who presents with acute altered mental status after being found in the [employer] grocery store in [facility]. Patient is an 51-year-old male whose only information in care everywhere involves visits to the emergency department and July 2019 for heat exhaustion was found in the [employer] store confused asking for help getting home today. By police it was later determined that the patient had a camper parked in the parking lot and he has been confused further information is not able to be obtained at this time. Patient is unable to provide any history and further chart is not able to be accessed. Patient has a CT head performed in the emergency department showing small vascular ischemic disease and all signs are significant only for hypertension base metabolic profile shows a BUN of 29 and creatinine 1.2 but again there is no baseline renal function.   LFTs are normal urine drug screen is negative alcohol level is negative troponin is negative CBC shows only normocytic anemia of 12.2 and there is no signs of any urine tract infection patient is confused and agitated but agreeable however he cannot provide any history had subjective examination he tells me that he is from [facility] however told the ER physician is from [place]    Past Medical History:  Past medical history is not able to obtain due to altered mentation and lack of thorough electronic medical record    [place] records do show a history of hypertension, insomnia, Raynaud's phenomenon, B12 deficiency, vitamin D deficiency, GERD,    Past Surgical History:  Patient unable to provide a surgical history no significant surgical history noted on [place] medical record    Medications Prior to Admission: This list is obtained from the [place] records however there is at least 5 of each prescription noted in the medical record and is very possible that some of these or double prescriptions are old prescriptions. Attempts will be made to contact the [address] for medication clarification    Aspirin 81 mg daily  Atenolol 25 mg daily  Brimonidine 0.2% solution 1 drop both eyes twice a day  Vitamin D 2000 units daily  B12 1000 mcg daily  Dorzolamide timolol 22.3/6.8 mg 1 drop both eyes twice a day  losartin 50 mg by mouth daily  Seroquel 25 mg by mouth nightly      Allergies:  Patient has no known allergies. Social History:   TOBACCO:   reports that he has never smoked. He has never used smokeless tobacco.  ETOH:   reports previous alcohol use. OCCUPATION: Unable to be obtained however the patient have obviously has some [place] history because he has a medication list and a problem list noted in the [place] records further details are not able to be obtained from the care everywhere tab  Family History:   History reviewed. No pertinent family history. REVIEW OF SYSTEMS:  Ten systems reviewed and negative except for as above. Physical Exam:    Vitals: BP (!) 168/72   Pulse 75   Temp 97.3 °F (36.3 °C) (Oral)   Resp 18   Ht 5' 9\" (1.753 m)   Wt 160 lb (72.6 kg)   SpO2 100%   BMI 23.63 kg/m²   Constitutional: Awake and arousable but oriented only to self following all commands pleasant and agreeable  Skin: Skin color, texture, turgor normal. No rashes or lesions  Eyes:Eye: Normal external eye, conjunctiva, CORTNEY. ENT: Head: Normocephalic, no lesions, without obvious abnormality.   Neck: no adenopathy, no carotid bruit, no JVD, supple, symmetrical, trachea midline and thyroid not enlarged, symmetric, no tenderness/mass/nodules  Respiratory: clear to auscultation bilaterally, diminished with no wheezes rales or rhonchi  Cardiovascular: regular rate and rhythm, S1, S2 normal, no murmur, click, rub or gallop  Gastrointestinal: soft, non-tender; bowel sounds normal; no masses,  no organomegaly  Genitourinary: Deferred  Musculoskeletal:extremities normal, atraumatic, no cyanosis or edema. Unable to remove the socks as the patient states he is \"sensitive and given the levels of aggression the patient had earlier will need to reexamine his lower extremities once he is more agreeable  Neurologic: Mental status AAOx self, no clear focal deficits no guarding no rigidity no loss of sensation numbness or tingling  Psychiatric: Or Insight poor judgment not currently competent to make his own decisions  Hematologic: No obvious bruising or bleeding    Recent Labs     11/30/20 0045   WBC 7.5   HGB 12.2*        Recent Labs     11/30/20 0045      K 3.7      CO2 22   BUN 29*   CREATININE 1.20   GLUCOSE 85   AST 18   ALT 7   BILITOT 0.5   ALKPHOS 57     Troponin T:   Recent Labs     11/30/20 0045   TROPONINI <0.010     PURINALYSIS:  Recent Labs     11/30/20 0045   COLORU Yellow   PHUR 5.0   WBCUA 0-2   RBCUA 6-10*   BACTERIA Negative   CLARITYU Clear   SPECGRAV 1.024   LEUKOCYTESUR Negative   UROBILINOGEN 1.0   BILIRUBINUR Negative   BLOODU TRACE*   GLUCOSEU Negative     -----------------------------------------------------------------   No results found. Assessment and Plan   1. Altered mental status: Unable to determine whether this is acute metabolic encephalopathy versus toxic encephalopathy versus organic brain disease we will consult neurology and psychiatry for further recommendations. MRI brain. Neurochecks. EEG. Concern for psychologic amnesia versus dementia or post seizure/stroke amnesia. Neurochecks. Lipids. Aspirin, Plavix, blood pressure control. High-dose Thiamine in the Event There Is Some Warnicke's Encephalopathy.   Consult to case management in order to further obtain patient's contacts, family members or other medical history. 2. Hypertension: Resume l losartan  3. Cancer for glaucoma: Resume eyedrops  4. GERD: Pepcid  5.  DVT prophylaxis Lovenox    Patient Active Problem List   Diagnosis Code    AMS (altered mental status) R41.82       Sophia Mills DO, admitting hospitalist    Emergency Contact:

## 2020-11-30 NOTE — PROGRESS NOTES
[facility] OCCUPATIONAL THERAPY EVALUATION - ACUTE     NAME: Good Higginbotham  : 1933 (80 y.o.)  MRN: [id number]  CODE STATUS: Full Code  Room: [place] [id number]    Date of Service: 2020    Patient Diagnosis(es): AMS (altered mental status) [R41.82]   Chief Complaint   Patient presents with    Altered Mental Status     Pt found in RV camper in parking lot; lost     Patient Active Problem List    Diagnosis Date Noted    AMS (altered mental status) 2020        Past Medical History:   Diagnosis Date    Hypertension      History reviewed. No pertinent surgical history. Restrictions:Fall        Safety Devices: Safety Devices  Safety Devices in place: Yes  Type of devices: All fall risk precautions in place   Initially in place: No    Subjective:\"I have family and friends that can help. \"       Pain Reassessment: 0/10 pain reported          Prior Level of Function:  Social/Functional History  Lives With: Alone  Type of Home: (Pt lives in a camper)  Home Layout: One level  Home Access: Stairs to enter with rails  Entrance Stairs - Number of Steps: 2  Home Equipment: Minta Ruffing  ADL Assistance: [facility]: Independent  Homemaking Responsibilities: Yes  Ambulation Assistance: Independent  Transfer Assistance: Independent  Active : Yes  Additional Comments: Pt reports that he drives.   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)  Vision Exceptions: Wears glasses at all times  Hearing  Hearing: Exceptions to [facility]  Hearing Exceptions: Hard of hearing/hearing concerns, Right hearing aid(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)     ROM:   LUE AROM (degrees)  LUE AROM : WFL  Left Hand AROM (degrees)  Left Hand AROM: WFL  RUE AROM (degrees)  RUE AROM : WFL  Right Hand AROM (degrees)  Right Hand AROM: WFL    Strength:  LUE Strength  Gross LUE Strength: WFL  L Hand General: 4/5  RUE Strength  Gross RUE Strength: WFL  R Hand General: 4+/5    Coordination, Tone, Quality of Movement:    Tone RUE  RUE Tone: Normotonic  Tone LUE  LUE Tone: Normotonic  Coordination  Movements Are Fluid And Coordinated: No  Coordination and Movement description: Decreased speed, Left UE, Right UE    Hand Dominance:  Hand Dominance  Hand Dominance: Left    ADL Status:  ADL  Feeding: Unable to assess(comment)  Grooming: Stand by assistance  UE Bathing: Contact guard assistance  LE Bathing: Minimal assistance  UE Dressing: Contact guard assistance  LE Dressing: Minimal assistance  Toileting: Contact guard assistance          Therapy key for assistance levels -   Independent = Pt. is able to perform task with no assistance but may require a device   Stand by assistance = Pt. does not perform task at an independent level but does not need physical assistance, requires verbal cues  Minimal, Moderate, Maximal Assistance = Pt. requires physical assistance (25%, 50%, 75% assist from helper) for task but is able to actively participate in task   Dependent = Pt. requires total assistance with task and is not able to actively participate with task completion     Functional Mobility:     Transfers  Sit to stand: Contact guard assistance  Stand to sit: Contact guard assistance    Bed Mobility  Bed mobility  Supine to Sit: Supervision    Seated and Standing Balance:  Balance  Sitting Balance: Supervision  Standing Balance: Contact guard assistance    Functional Endurance:  Activity Tolerance  Activity Tolerance: Patient Tolerated treatment well  Activity Tolerance: Fair    D/C Recommendations:  OT D/C RECOMMENDATIONS  REQUIRES OT FOLLOW UP: Yes    Equipment Recommendations:       OT Education:        OT Follow Up:  OT D/C RECOMMENDATIONS  REQUIRES OT FOLLOW UP: Yes       Assessment/Discharge Disposition:     Performance deficits / Impairments: Decreased functional mobility , Decreased strength, Decreased endurance, Decreased ADL status, Decreased cognition, Decreased balance, Decreased safe awareness  Prognosis: Fair  Discharge Recommendations: Continue to assess pending progress  Decision Making: Medium Complexity  History: 1  complexity  Exam: 7 deficits  Assistance / Modification: Min A    Six Click Score    How much help for putting on and taking off regular lower body clothing?: A Little  How much help for Bathing?: A Little  How much help for Toileting?: None  How much help for putting on and taking off regular upper body clothing?: None  How much help for taking care of personal grooming?: None  How much help for eating meals?: None  AM-[facility] Inpatient Daily Activity Raw Score: 22  AM-PAC Inpatient ADL T-Scale Score : 47.1  ADL Inpatient CMS 0-100% Score: 25.8    Plan:  Plan  Times per week: 1-4x/wk  Current Treatment Recommendations: Strengthening, Functional Mobility Training, Endurance Training, Balance Training, Neuromuscular Re-education, Self-Care / ADL, Safety Education & Training, Cognitive/Perceptual Training, Cognitive Reorientation    Goals:   Patient will:    - Improve functional endurance to tolerate/complete 10-15 mins of ADL's  - Be independent in UB ADLs   - Be independent in LB ADLs  - Be independent in ADL transfers without LOB  - Be independent in toileting tasks  - Improve bilateral UE strength and endurance to Fair+ in order to participate in self-care activities as projected. - Access appropriate D/C site with as few architectural barriers as possible.   - Sequence self-care tasks with opout verbal cues    Patient Goal: Patient goals :  To return to [facility] alone      Discussed and agreed upon: Yes Comments:     Therapy Time:   OT Individual Minutes  Time In: 1045  Time Out: 1105  Minutes: 20    Eval: 20 minutes     Electronically signed by:    SALMA Retana  85/40/1013, 11:33 AM Electronically signed by SALMA Retana on 56/13/08 at 11:32 AM EST

## 2020-11-30 NOTE — PROGRESS NOTES
Oriented to person, Disoriented to place, Disoriented to time, Disoriented to situation  Follows Commands: Within Functional Limits    Observation/Palpation  Observation: pt pleasant and cooperative    ROM:  RLE AROM: WFL  LLE AROM : WFL    Strength:  Strength RLE  Comment: grossly 4+/5  Strength LLE  Comment: grossly 4+/5    Neuro:  Balance  Posture: Fair(trunk flexion and head forward)  Sitting - Static: Good  Sitting - Dynamic: Good  Standing - Static: Fair  Standing - Dynamic: Fair;-(unsteadiness with unilat UE support)     Tone LLE  LLE Tone: Normotonic  Motor Control  Gross Motor?: WFL  Sensation  Overall Sensation Status: WFL    Bed mobility  Supine to Sit: Stand by assistance  Sit to Supine: Stand by assistance  Comment: SBA due to decreased insight into deficits    Transfers  Sit to Stand: Stand by assistance  Stand to sit: Contact guard assistance(pt falling back into chair)  Comment: cane    Ambulation  Ambulation?: Yes  Ambulation 1  Surface: level tile  Device: Single point cane  Assistance: Contact guard assistance  Quality of Gait: unsteady with unilat UE support  Gait Deviations: Increased RICK;Shuffles  Distance: 40 ft  Comments: pt reaching for furniture with R UE during gait; cane L UE              Activity Tolerance  Activity Tolerance: Patient Tolerated treatment well;Patient limited by cognitive status          PT Education  PT Education: Goals;PT Role;Plan of Care;General Safety    ASSESSMENT:   Body structures, Functions, Activity limitations: Decreased functional mobility ; Decreased cognition;Decreased posture;Decreased endurance;Decreased strength;Decreased balance;Decreased safe awareness  Decision Making: Medium Complexity  History: high  Exam: medium  Clinical Presentation: medium    Prognosis: Good    DISCHARGE RECOMMENDATIONS:  Discharge Recommendations: Continue to assess pending progress, Patient would benefit from continued therapy after discharge    Assessment: Pt exhibits decreased LE strength, decreased balance, decreased cognition, increased assistance with all functional mobility; therefore, pt requires continued therapy for safe return to [facility]. REQUIRES PT FOLLOW UP: Yes      PLAN OF CARE:  Plan  Times per week: 3-6  Current Treatment Recommendations: Strengthening, Transfer Training, Endurance Training, Cognitive Reorientation, Neuromuscular Re-education, Patient/Caregiver Education & Training, Manual Therapy - Soft Tissue Mobilization, Pain Management, Equipment Evaluation, Education, & procurement, Balance Training, Gait Training, Home Exercise Program, Modalities, Functional Mobility Training, Stair training, Safety Education & Training  Safety Devices  Type of devices: Call light within reach, Chair alarm in place, Left in chair    Goals:  Long term goals  Long term goal 1: Pt will demonstrate bed mobility indep  Long term goal 2: Pt will demosntrate transfers mod indep with cane  Long term goal 3: Pt will demonstrate amb 150ft mod indep with cane without LOB for safety with functional mobility  Long term goal 4: Pt will demonstrate stair negotiation 2 steps with 1 grab bar mod indep to allow pt to enter and exit his [facility] ([address] [place] [address]) Nicole 95 Raw Score : 20    Therapy Time:   Individual   Time In 1045   Time Out 1105   Minutes 20           Sue Wahl, PT, 11/30/20 at 11:32 AM         Definitions for assistance levels  Independent = pt does not require any physical supervision or assistance from another person for activity completion. Device may be needed.   Stand by assistance = pt requires verbal cues or instructions from another person, close to but not touching, to perform the activity  Minimal assistance= pt performs 75% or more of the activity; assistance is required to complete the activity  Moderate assistance= pt performs 50% of the activity; assistance is required to complete the activity  Maximal assistance = pt performs 25% of the activity; assistance is required to complete the activity  Dependent = pt requires total physical assistance to accomplish the task

## 2020-11-30 NOTE — FLOWSHEET NOTE
3168- Shift assessment completed at this time, Pt A&Ox1, denies chest pain/SOB, denies nausea/vomiting, pt disoriented and doesn't recall why he is here, follows commands, pt complains of blurry vision, gait unsteady, pt denies pain, pt reoriented frequently and needs a lot of verbal cues to get patient to and from bathroom, bed alarm engaged-KGW  1224- Pt confused, attempting to reorient, pt refusing to let this nurse take vital signs, pt stating he is putting his shoes on and leaving, pt refusing to fill out MRI form at this time, pt refusing to have MRI, [employer] message sent to Dr. Ramos, no new orders received-KGW  1720- Pt arrived back from MRI, pt was combative and not following commands, pt throwing legs over side rails, pt pushing staff and not wanting to stay in bed, Dr. Ramos made aware new orders received for [facility]- Pt resting comfortably in bed, no apparent signs/symptoms of distress, AVASYS monitor in place, bed alarm engaged-KGW  Electronically signed by Momo Walters RN on 11/30/2020

## 2020-11-30 NOTE — ED NOTES
Patient resting in bed with no s/s of distress. Respirations even and unlabored.       Abigail Ricci RN  11/30/20 0191

## 2020-11-30 NOTE — PROGRESS NOTES
[facility] Occupational Therapy      Date: 2020  Patient Name: Manuel Harper        MRN: [id number]  Account: [de-identified]   : 1933  (80 y.o.)  [id number]    Pt. is of observation status. To comply with medicare and insurance regulations, to see patient we require updated orders including a valid OT treatment diagnosis. Please reorder as appropriate.      Electronically signed by ANGELIC Lin/L on  at 8:10 AM

## 2020-11-30 NOTE — ED NOTES
Bed: 08  Expected date:   Expected time:   Means of arrival:   Comments:  Dariel Young 89, RN  11/30/20 0025

## 2020-11-30 NOTE — PROGRESS NOTES
Physical Therapy  Facility/Department: [facility] MED SURG C635/M241-12      PT evaluation attempted 11/30/20, at 8:09 AM EST, however this patient status is currently observation. Per facility protocol, PT evaluation and treatment orders for patients in observation status must include a PT specific diagnosis (i.e. \"difficulty ambulating\", \"lack of coordination\", etc.) These order specifications may be added to the \"comments\" section of the PT evaluation and treatment order. Requested updated Physical Therapy orders from referring provider via \"sticky note\". Coordination team notified.      We thank you for your referral!    [facility],  [facility]) Physical Therapy Department    Electronically signed by Vishnu Villatoro PT on 11/30/20 at 8:09 AM EST

## 2020-11-30 NOTE — PROGRESS NOTES
[facility] Occupational Therapy      Date: 2020  Patient Name: Yon Diaz        MRN: [id number]  Account: [de-identified]   : 1933  (80 y.o.)  Room: [place] [address]    Chart reviewed, attempted OT at [phone] for evaluation. Patient not seen 2° to:    Pt. having bedside procedure. Will attempt again when able.     Electronically signed by SALMA Retana on  at 10:40 AM

## 2020-11-30 NOTE — PROGRESS NOTES
Attempted MRI and patient began climbing and crawling out of the MRI scanner. We were able to get him out and no to his cart. The patient was very confused and did not want to get back on the cart, but we were able to convince him to . Limited images obtained.   Patient refused to lay back on the MRI table

## 2020-11-30 NOTE — ED TRIAGE NOTES
Patient was brought in by EMS. Pt was found in [place] parking lot asking strangers how to get home. Patient is currently A&Ox1. Respirations even and unlabored. Denies HA, SOB, chest pain, ABD pain, fevers, chills, nausea, vomiting, diarrhea.

## 2020-11-30 NOTE — ED PROVIDER NOTES
[facility] ED  eMERGENCY dEPARTMENT eNCOUnter      Pt Name: Nestor Adams  MRN: [id number]  Tetegfnamrata 6/23/1933  Date of evaluation: 11/30/2020  Provider: Fadumo Rodriguez DO    CHIEF COMPLAINT       Chief Complaint   Patient presents with    Altered Mental Status     Pt found in RV camper in parking lot; lost         HISTORY OF PRESENT ILLNESS   (Location/Symptom, Timing/Onset,Context/Setting, Quality, Duration, Modifying Factors, Severity)  Note limiting factors. Nestor Adams is a 80 y.o. male who presents to the emergency department complaining of nothing. The patient was found in an RV camper in a parking lot he apparently was lost and disoriented to time. She admits that he is living in the RV camper. He was found in the store he states he was hungry was getting food however when I offered him food now he states he was not hungry. He thinks it is April. He denies any pain from head to toe on questioning. HPI    NursingNotes were reviewed. REVIEW OF SYSTEMS    (2-9 systems for level 4, 10 or more for level 5)     Review of Systems   Constitutional: Negative. Negative for chills and fever. HENT: Negative. Negative for ear pain, sinus pain and sore throat. Eyes: Negative. Negative for pain and discharge. Respiratory: Negative. Negative for cough, chest tightness, shortness of breath and wheezing. Cardiovascular: Negative. Negative for chest pain, palpitations and leg swelling. Gastrointestinal: Negative. Negative for abdominal distention, abdominal pain, blood in stool, diarrhea, nausea and vomiting. Endocrine: Negative. Negative for polydipsia and polyuria. Genitourinary: Negative. Negative for difficulty urinating, dysuria, flank pain, frequency, hematuria and urgency. Musculoskeletal: Negative. Negative for arthralgias, back pain, myalgias and neck pain. Skin: Negative. Negative for rash and wound. Neurological: Negative.   Negative for dizziness, seizures, syncope, weakness and headaches. Hematological: Negative. Negative for adenopathy. Does not bruise/bleed easily. Psychiatric/Behavioral: Negative. Negative for confusion, hallucinations, self-injury and suicidal ideas. All other systems reviewed and are negative. Except as noted above the remainder of the review of systems was reviewed and negative. PAST MEDICAL HISTORY     Past Medical History:   Diagnosis Date    Hypertension          SURGICALHISTORY     History reviewed. No pertinent surgical history. CURRENT MEDICATIONS       Previous Medications    No medications on file       ALLERGIES     Patient has no known allergies. FAMILY HISTORY     History reviewed. No pertinent family history.        SOCIAL HISTORY       Social History     Socioeconomic History    Marital status: Single     Spouse name: None    Number of children: None    Years of education: None    Highest education level: None   Occupational History    None   Social Needs    Financial resource strain: None    Food insecurity     Worry: None     Inability: None    Transportation needs     Medical: None     Non-medical: None   Tobacco Use    Smoking status: Never Smoker    Smokeless tobacco: Never Used   Substance and Sexual Activity    Alcohol use: Not Currently     Frequency: Never    Drug use: Never    Sexual activity: None   Lifestyle    Physical activity     Days per week: None     Minutes per session: None    Stress: None   Relationships    Social connections     Talks on phone: None     Gets together: None     Attends Holiness service: None     Active member of club or organization: None     Attends meetings of clubs or organizations: None     Relationship status: None    Intimate partner violence     Fear of current or ex partner: None     Emotionally abused: None     Physically abused: None     Forced sexual activity: None   Other Topics Concern    None   Social History Narrative    None       SCREENINGS      @FLOW([id number])@      PHYSICAL EXAM    (up to 7 for level 4, 8 or more for level 5)     ED Triage Vitals   BP Temp Temp Source Pulse Resp SpO2 Height Weight   11/30/20 0032 11/30/20 0026 11/30/20 0026 11/30/20 0032 11/30/20 0026 11/30/20 0032 11/30/20 0032 11/30/20 0032   (!) 203/106 98.7 °F (37.1 °C) Oral 78 16 98 % 5' 9\" (1.753 m) 160 lb (72.6 kg)       Physical Exam  Vitals signs and nursing note reviewed. Constitutional:       General: He is not in acute distress. Appearance: He is well-developed. He is not ill-appearing, toxic-appearing or diaphoretic. HENT:      Head: Normocephalic and atraumatic. Eyes:      General: No visual field deficit or scleral icterus. Conjunctiva/sclera: Conjunctivae normal.      Pupils: Pupils are equal, round, and reactive to light. Pupils are equal.   Neck:      Musculoskeletal: Normal range of motion and neck supple. No neck rigidity. Cardiovascular:      Rate and Rhythm: Normal rate and regular rhythm. Heart sounds: Normal heart sounds. No murmur. No friction rub. Pulmonary:      Effort: Pulmonary effort is normal. No respiratory distress. Breath sounds: Normal breath sounds. Abdominal:      General: Bowel sounds are normal. There is no distension. Palpations: Abdomen is soft. Tenderness: There is no abdominal tenderness. Musculoskeletal: Normal range of motion. General: No swelling or tenderness. Lymphadenopathy:      Cervical: No cervical adenopathy. Skin:     General: Skin is warm and dry. Coloration: Skin is not cyanotic or pale. Findings: No erythema or rash. Neurological:      Mental Status: He is alert and oriented to person, place, and time. Cranial Nerves: No cranial nerve deficit, dysarthria or facial asymmetry. Sensory: No sensory deficit. Motor: No weakness. Psychiatric:         Mood and Affect: Mood is not anxious or depressed.          Behavior: Behavior normal.         DIAGNOSTIC RESULTS     EKG: All EKG's are interpreted by the Emergency Department Physician who either signs or Co-signsthis chart in the absence of a cardiologist.    None    RADIOLOGY:   Non-plain filmimages such as CT, Ultrasound and MRI are read by the radiologist. Plain radiographic images are visualized and preliminarily interpreted by the emergency physician with the below findings:    CT scan shows no acute intracranial pathology. Chest x-ray is negative. Interpretation per the Radiologist below, if available at the time ofthis note:    CT Head WO Contrast    (Results Pending)   XR CHEST PORTABLE    (Results Pending)         ED BEDSIDE ULTRASOUND:   Performed by ED Physician - none    LABS:  Labs Reviewed   COMPREHENSIVE METABOLIC PANEL - Abnormal; Notable for the following components:       Result Value    BUN 29 (*)     GFR Non- 57.2 (*)     Total Protein 8.5 (*)     Globulin 4.0 (*)     All other components within normal limits   CBC WITH AUTO DIFFERENTIAL - Abnormal; Notable for the following components:    RBC 3.88 (*)     Hemoglobin 12.2 (*)     Hematocrit 35.2 (*)     MCH 31.5 (*)     All other components within normal limits   ETHANOL   URINE DRUG SCREEN   TROPONIN   URINE RT REFLEX TO CULTURE       All other labs were within normal range or not returned as of this dictation. EMERGENCY DEPARTMENT COURSE and DIFFERENTIAL DIAGNOSIS/MDM:   Vitals:    Vitals:    11/30/20 0100 11/30/20 0130 11/30/20 0200 11/30/20 0230   BP: (!) 178/82 (!) 169/80 (!) 183/94 (!) 193/89   Pulse: 67 58 63 62   Resp:   18 17   Temp:       TempSrc:       SpO2: 98% 96% 95% 98%   Weight:       Height:           Patient CT scan of the head is negative. Electrolytes are normal no metabolic acidosis troponin is negative liver profile is normal counts only 7.5 and normal no infection his hemoglobin is 12.2 slightly low. Patient's drug screen is negative.   The patient was discussed with  Seder and the patient will be admitted. [facility]    CRITICAL CARE TIME   Total Critical Care time was 0 minutes, excluding separately reportableprocedures. There was a high probability of clinicallysignificant/life threatening deterioration in the patient's condition which required my urgent intervention. CONSULTS:  None    PROCEDURES:  Unless otherwise noted below, none     Procedures    FINAL IMPRESSION      1. Disorientation    2. Altered mental status, unspecified altered mental status type          DISPOSITION/PLAN   DISPOSITION Decision To Admit 11/30/2020 03:04:28 AM      PATIENT REFERRED TO:  No follow-up provider specified.     DISCHARGE MEDICATIONS:  New Prescriptions    No medications on file          (Please note that portions of this note were completed with a voice recognition program.  Efforts were made to edit the dictations but occasionally words are mis-transcribed.)    Valentina Parnell DO (electronically signed)  Attending Emergency Physician          Valentina Parnell DO  11/30/20 [address] [place] [address]  11/30/20 4425

## 2020-11-30 NOTE — ED NOTES
Second attempt to call report to [facility] without answer.      Solitario Mahoney RN  11/30/20 9181

## 2020-11-30 NOTE — ED NOTES
Patient back from CT with no s/s of distress. Spoke with dispatcher Jade Lawrence who [place] and states he cannot find the patient's name listed in the [facility]. Patient states he has one son named Lesly Landa who lives in [place]. Dr Ajit Sadler given update.       Gail Gastelum, RN  11/30/20 1944

## 2020-11-30 NOTE — PROGRESS NOTES
[facility]   Facility/Department: [facility] [address] Ripa  Speech Language Pathology  Initial Speech/Language/Cognitive Assessment    NAME:Onofre King  : 1933 (80 y.o.)   MRN: [id number]  [facility]  ADMISSION DATE: 2020  PATIENT DIAGNOSIS(ES): AMS (altered mental status) [R41.82]  Chief Complaint   Patient presents with    Altered Mental Status     [facility]     Patient Active Problem List    Diagnosis Date Noted    AMS (altered mental status) 2020     Past Medical History:   Diagnosis Date    Hypertension      History reviewed. No pertinent surgical history. 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 Recommendations:  Requires SLP Intervention: Yes  Duration/Frequency of Treatment: 2-4 cog  D/C Recommendations: To be determined          Goals:  Short-term Goals  Timeframe for Short-term Goals: 2 weeks  Goal 1: To increase safety awareness and judgment for safe completion of ADLs secondary to pt's cognitive deficits,  pt will complete basic level problem solving tasks related to ADLs (e.g. finances and medication) with 70% accuracy and min cues. Goal 2: To increase safety awareness and judgment for safe completion of ADLs secondary to pt's cognitive deficits, pt will provide reasonable solutions to problems of everyday living with 80% accuracy and min cues.   Goal 3: To increase safety awareness and judgment for safe completion of ADLs secondary to pt's cognitive deficits, pt will complete abstract reasoning tasks (i.e. Word deduction, convergent and divergent naming, similarities/differences) with 80% accuracy and min cues. Goal 4: To address pt's cognitive deficits and promote orientation, pt will state name of facility, time within 1 hour, reason in hospital, current month and year with 100% accuracy with min assist, with use of external aid. Goal 5: To address pt's cognitive deficits and promote his/her ability to safely follow directives in a variety of environments, pt will carry out verbal directives of increasing complexity in everyday activities with 80% accuracy supervision cues. Goal 6: To decrease pt's cognitive deficits through the use of compensatory strategies, the pt will be educated on 3 different memory strategies and verbalize how he/she might use them at home in 3 ways with min cues. Long-term Goals  Timeframe for Long-term Goals: 2 weeks  Goal 1: Pt will improve his Cognition from severe assist to min to  mod for adequate functional recall and safety awareness for home. Subjective:  General  Chart Reviewed: Yes  Family / Caregiver Present: No  Subjective  Subjective: Pt alert and cooperaitve throughout the evaluation. Social/Functional History  Lives With: Alone  Education: Pt is poor historian. Per pt, he has a high school education. Occupation: Retired  Type of occupation: Per pt, pt has retired from farming and . Vision  Vision: Impaired  Vision Exceptions: Wears glasses at all times(Pt demonstrated difficulty seeing even with his glasses.)  Hearing  Hearing: Exceptions to [facility]  Hearing Exceptions: Hard of hearing/hearing concerns;Right hearing aid           Objective:     Oral/Motor  Oral Motor: Within functional limits    Auditory Comprehension  Comprehension: Exceptions  Basic Questions: Moderate  Complex Questions: Severe  Multistep Basic Commands:  Moderate  Common Objects: Mild  Pictures: Mild  Interfering Components: Working memory    Reading Comprehension  Reading Status: Unable to assess(Pt refused reading task at this time as he demonstrated difficulty seeing. Pt stated, \"even with glasses I have difficulties. \")    Expression  Primary Mode of Expression: Verbal    Verbal Expression  Verbal Expression: Exceptions to functional limits  Automatic Speech: Mild(Difficulty with months of the year.)  Confrontation: Mild  Convergent: Severe  Divergent: Severe    Written Expression  Dominant Hand: Left    Motor Speech  Motor Speech: Within Functional Limits    Pragmatics/Social Functioning  Pragmatics: Within functional limits    Cognition:      Orientation  Overall Orientation Status: Impaired  Orientation Level: Oriented to person;Disoriented to place; Disoriented to time;Disoriented to situation  Attention  Attention: Within Functional Limits  Memory  Memory: Exceptions to [address] Encountered: Moderate  Short-term Memory: Moderate  Working Memory: Moderate  Problem Solving  Problem Solving: Exceptions to [facility]  Managing Finances: Severe  Managing Medications: Severe  Numeric Reasoning  Numeric Reasoning: Exceptions to [facility]   Calculations: Severe  Money Management: Severe  Abstract Reasoning  Abstract Reasoning: Exceptions to [facility]  Convergent Thinking: Severe  Divergent Thinking: Severe  Safety/Judgement  Safety/Judgement: Exceptions to [facility]  Complex Functional Tasks: Severe  Novel Situations: Severe  Insight: Moderate      Additional Assessments:  Portions of the RIPA-2 [facility] Information Processing Assessment-2nd Edition ) were administered. Scores are as follows:  Subtest: Raw Score Standard Score   I. Immediate Memory 14 6   II. Recent Memory 7 3   III.  Temporal Orientation 14 7       Prognosis:  Speech Therapy Prognosis  Prognosis: Fair  Prognosis Considerations: Severity of Impairments  Individuals consulted  Consulted and agree with results and recommendations: Patient    Education:  Patient Education: Pt was educated on SLE results  Patient Education Response: Verbalizes understanding;Needs reinforcement  Safety Devices in place: Yes  Type of devices: Call light within reach; Chair alarm in place    Pain Assessment:  Initial Assessment:  Patient denies pain. Re-assessment:  Patient denies pain.       Therapy Time  SLP Individual Minutes  Time In: 1300  Time Out: Travisfort  Minutes: 25                 Signature: Electronically signed by BRUNO Pretty on 11/30/2020 at 1:52 PM

## 2020-12-01 PROBLEM — G93.40 ENCEPHALOPATHY ACUTE: Status: ACTIVE | Noted: 2020-12-01

## 2020-12-01 LAB
ANION GAP SERPL CALCULATED.3IONS-SCNC: 13 MEQ/L (ref 9–15)
BASOPHILS ABSOLUTE: 0.1 K/UL (ref 0–0.2)
BASOPHILS RELATIVE PERCENT: 1.2 %
BUN BLDV-MCNC: 21 MG/DL (ref 8–23)
CALCIUM SERPL-MCNC: 8.4 MG/DL (ref 8.5–9.9)
CHLORIDE BLD-SCNC: 108 MEQ/L (ref 95–107)
CO2: 19 MEQ/L (ref 20–31)
CREAT SERPL-MCNC: 1.07 MG/DL (ref 0.7–1.2)
EOSINOPHILS ABSOLUTE: 0.1 K/UL (ref 0–0.7)
EOSINOPHILS RELATIVE PERCENT: 1.6 %
GFR AFRICAN AMERICAN: >60
GFR NON-AFRICAN AMERICAN: >60
GLUCOSE BLD-MCNC: 63 MG/DL (ref 70–99)
HCT VFR BLD CALC: 34 % (ref 42–52)
HEMOGLOBIN: 11 G/DL (ref 14–18)
LYMPHOCYTES ABSOLUTE: 1.2 K/UL (ref 1–4.8)
LYMPHOCYTES RELATIVE PERCENT: 20.7 %
MCH RBC QN AUTO: 30.6 PG (ref 27–31.3)
MCHC RBC AUTO-ENTMCNC: 32.3 % (ref 33–37)
MCV RBC AUTO: 94.6 FL (ref 80–100)
MONOCYTES ABSOLUTE: 0.5 K/UL (ref 0.2–0.8)
MONOCYTES RELATIVE PERCENT: 8.4 %
NEUTROPHILS ABSOLUTE: 4 K/UL (ref 1.4–6.5)
NEUTROPHILS RELATIVE PERCENT: 68.1 %
PDW BLD-RTO: 14.2 % (ref 11.5–14.5)
PLATELET # BLD: 251 K/UL (ref 130–400)
POTASSIUM SERPL-SCNC: 3.6 MEQ/L (ref 3.4–4.9)
RBC # BLD: 3.6 M/UL (ref 4.7–6.1)
SODIUM BLD-SCNC: 140 MEQ/L (ref 135–144)
WBC # BLD: 5.9 K/UL (ref 4.8–10.8)

## 2020-12-01 PROCEDURE — 6370000000 HC RX 637 (ALT 250 FOR IP): Performed by: PSYCHIATRY & NEUROLOGY

## 2020-12-01 PROCEDURE — 6370000000 HC RX 637 (ALT 250 FOR IP): Performed by: INTERNAL MEDICINE

## 2020-12-01 PROCEDURE — 97116 GAIT TRAINING THERAPY: CPT

## 2020-12-01 PROCEDURE — 97129 THER IVNTJ 1ST 15 MIN: CPT

## 2020-12-01 PROCEDURE — 97535 SELF CARE MNGMENT TRAINING: CPT

## 2020-12-01 PROCEDURE — 2580000003 HC RX 258: Performed by: INTERNAL MEDICINE

## 2020-12-01 PROCEDURE — 36415 COLL VENOUS BLD VENIPUNCTURE: CPT

## 2020-12-01 PROCEDURE — 85025 COMPLETE CBC W/AUTO DIFF WBC: CPT

## 2020-12-01 PROCEDURE — 92526 ORAL FUNCTION THERAPY: CPT

## 2020-12-01 PROCEDURE — 2580000003 HC RX 258: Performed by: EMERGENCY MEDICINE

## 2020-12-01 PROCEDURE — 6360000002 HC RX W HCPCS: Performed by: INTERNAL MEDICINE

## 2020-12-01 PROCEDURE — 99233 SBSQ HOSP IP/OBS HIGH 50: CPT | Performed by: PSYCHIATRY & NEUROLOGY

## 2020-12-01 PROCEDURE — 96372 THER/PROPH/DIAG INJ SC/IM: CPT

## 2020-12-01 PROCEDURE — 1210000000 HC MED SURG R&B

## 2020-12-01 PROCEDURE — 80048 BASIC METABOLIC PNL TOTAL CA: CPT

## 2020-12-01 RX ORDER — MEMANTINE HYDROCHLORIDE 10 MG/1
10 TABLET ORAL 2 TIMES DAILY
Status: DISCONTINUED | OUTPATIENT
Start: 2020-12-01 | End: 2020-12-04 | Stop reason: HOSPADM

## 2020-12-01 RX ADMIN — MEMANTINE HYDROCHLORIDE 10 MG: 10 TABLET ORAL at 13:01

## 2020-12-01 RX ADMIN — MEMANTINE HYDROCHLORIDE 10 MG: 10 TABLET ORAL at 21:35

## 2020-12-01 RX ADMIN — ASPIRIN 81 MG: 81 TABLET, COATED ORAL at 11:19

## 2020-12-01 RX ADMIN — ENOXAPARIN SODIUM 40 MG: 40 INJECTION SUBCUTANEOUS at 11:20

## 2020-12-01 RX ADMIN — TIMOLOL MALEATE 1 DROP: 5 SOLUTION/ DROPS OPHTHALMIC at 21:35

## 2020-12-01 RX ADMIN — LOSARTAN POTASSIUM 50 MG: 50 TABLET, FILM COATED ORAL at 11:19

## 2020-12-01 RX ADMIN — Medication 3 ML: at 11:20

## 2020-12-01 RX ADMIN — RISPERIDONE 0.25 MG: 0.5 TABLET ORAL at 21:34

## 2020-12-01 RX ADMIN — TIMOLOL MALEATE 1 DROP: 5 SOLUTION/ DROPS OPHTHALMIC at 11:22

## 2020-12-01 RX ADMIN — Medication 10 ML: at 21:35

## 2020-12-01 RX ADMIN — ROSUVASTATIN CALCIUM 20 MG: 20 TABLET, FILM COATED ORAL at 21:35

## 2020-12-01 RX ADMIN — DORZOLAMIDE HYDROCHLORIDE 1 DROP: 20 SOLUTION/ DROPS OPHTHALMIC at 21:35

## 2020-12-01 RX ADMIN — THIAMINE HYDROCHLORIDE 500 MG: 100 INJECTION, SOLUTION INTRAMUSCULAR; INTRAVENOUS at 13:01

## 2020-12-01 RX ADMIN — DORZOLAMIDE HYDROCHLORIDE 1 DROP: 20 SOLUTION/ DROPS OPHTHALMIC at 11:19

## 2020-12-01 ASSESSMENT — ENCOUNTER SYMPTOMS
TROUBLE SWALLOWING: 0
NAUSEA: 0
SHORTNESS OF BREATH: 0
COLOR CHANGE: 0
PHOTOPHOBIA: 0
VOMITING: 0
CHOKING: 0
BACK PAIN: 0

## 2020-12-01 ASSESSMENT — PAIN SCALES - GENERAL: PAINLEVEL_OUTOF10: 0

## 2020-12-01 NOTE — PROGRESS NOTES
Department of Internal Medicine  General Internal Medicine  Attending Progress Note      SUBJECTIVE:  Pt seen and examined. MRI negative. Gets agitated at night with sundowning episodes. No complaints today. Still oriented only to self. OBJECTIVE      Medications    Current Facility-Administered Medications: memantine (NAMENDA) tablet 10 mg, 10 mg, Oral, BID  [COMPLETED] Saline lock IV, , , Continuous **AND** sodium chloride flush 0.9 % injection 3 mL, 3 mL, Intravenous, Q8H  sodium chloride flush 0.9 % injection 10 mL, 10 mL, Intravenous, 2 times per day  sodium chloride flush 0.9 % injection 10 mL, 10 mL, Intravenous, PRN  acetaminophen (TYLENOL) tablet 650 mg, 650 mg, Oral, Q6H PRN **OR** acetaminophen (TYLENOL) suppository 650 mg, 650 mg, Rectal, Q6H PRN  polyethylene glycol (GLYCOLAX) packet 17 g, 17 g, Oral, Daily PRN  promethazine (PHENERGAN) tablet 12.5 mg, 12.5 mg, Oral, Q6H PRN **OR** ondansetron (ZOFRAN) injection 4 mg, 4 mg, Intravenous, Q6H PRN  enoxaparin (LOVENOX) injection 40 mg, 40 mg, Subcutaneous, Daily  labetalol (NORMODYNE;TRANDATE) injection 10 mg, 10 mg, Intravenous, Q6H PRN  losartan (COZAAR) tablet 50 mg, 50 mg, Oral, Daily  aspirin EC tablet 81 mg, 81 mg, Oral, Daily  rosuvastatin (CRESTOR) tablet 20 mg, 20 mg, Oral, Nightly  thiamine (B-1) 500 mg in sodium chloride 0.9 % 100 mL IVPB, 500 mg, Intravenous, Daily  dorzolamide (TRUSOPT) 2 % ophthalmic solution 1 drop, 1 drop, Both Eyes, TID  timolol (TIMOPTIC) 0.5 % ophthalmic solution 1 drop, 1 drop, Both Eyes, BID  risperiDONE (RISPERDAL) tablet 0.25 mg, 0.25 mg, Oral, Nightly  haloperidol lactate (HALDOL) injection 2 mg, 2 mg, Intramuscular, Q6H PRN  Physical    VITALS:  BP (!) 180/58   Pulse 76   Temp 97.7 °F (36.5 °C) (Oral)   Resp 18   Ht 5' 9\" (1.753 m)   Wt 160 lb (72.6 kg)   SpO2 99%   BMI 23.63 kg/m²   Constitutional: Oriented only to self.  Sitting in chair comfortably  Head: Normocephalic, atraumatic  Eyes: EOMI,

## 2020-12-01 NOTE — CARE COORDINATION
Call returned from Marjorie Leija, he is the son of Jennifer Arenas. He stated him and his father touch base now and then but do not keep in close touch. He is willing to take on guardianship. He will call tomorrow and speak with Cl Pace to give name of facility he would like the patient to go to. [facility] [place] of choice offered, game him the website for SNF selection. Updated Ny Michelle via telephone. Electronically signed by En Shahid RN on 12/1/2020 at 4:48 PM    Spoke with Alec Henry from [place] in [facility]. She confirmed Mr. Josselin Barbosa is a [facility] patient. She also stated he has an address of [facility] which is the address on our chart. She confirmed phone numbers also match. He has no HCPOA noted on chart. He had one emergency phone contact which is noted to be his son Axel Swanson, [address] I was unable to reach anyone at that number but did leave a message to call back with out leaving any identifying patient information. Bernard Silvestre stated she will look into his records to see if there is any information she can provide that would help to identify patient family further. VM message left for Ny Michelle who will follow up on 12/2.   Electronically signed by En Shahid RN on 12/1/2020 at 3:32 PM

## 2020-12-01 NOTE — PROGRESS NOTES
[facility]  Facility/Department: [facility] Concha Nagy  Speech Language Pathology   Treatment Note    Matt Todd  6/23/1933  [id number]    Medical Dx: AMS (altered mental status) [R41.82]  Encephalopathy acute [G93.40]  Speech Dx: Dysphagia and Cognitive Linguistic Impairment     12/1/2020    Subjective:  Self Limiting and Confused   Patient stated that he is being held here against his will but could not state where he is currently. SLP consistently re-educated pt regarding his reasoning for being in the hospital.     Patient also required encouragement to participate with SLP, stating \"I don't usually talk much. \"     Interventions used this date:  Cognitive Skill Development and Dysphagia Treatment    Objective/Assessment:  Patient progressing towards goals:  Short-term Goals  Timeframe for Short-term Goals: 2 weeks  Goal 1: To increase safety awareness and judgment for safe completion of ADLs secondary to pt's cognitive deficits,  pt will complete basic level problem solving tasks related to ADLs (e.g. finances and medication) with 70% accuracy and min cues. Pt completed simple medication management problems with 30% accuracy independently and increased to 80% accuracy with max verbal cues. Goal 2: To increase safety awareness and judgment for safe completion of ADLs secondary to pt's cognitive deficits, pt will provide reasonable solutions to problems of everyday living with 80% accuracy and min cues. Not addressed  Goal 3: To increase safety awareness and judgment for safe completion of ADLs secondary to pt's cognitive deficits, pt will complete abstract reasoning tasks (i.e. Word deduction, convergent and divergent naming, similarities/differences) with 80% accuracy and min cues. Pt completed divergent naming task with 50% accuracy with max verbal cues. Goal 4:  To address pt's cognitive deficits and promote orientation, pt will state name of facility, time within 1 hour, reason in hospital, current

## 2020-12-01 NOTE — PROGRESS NOTES
Physical Therapy Med Surg Daily Treatment Note  Facility/Department: [address] [place] [address] Ivan Echols [address]  [facility]       NAME: Nestor Adams  : 1933 (80 y.o.)  MRN: [id number]  CODE STATUS: Full Code    Date of Service: 2020    Patient Diagnosis(es): AMS (altered mental status) [R41.82]   Chief Complaint   Patient presents with    Altered Mental Status     Pt found in  camper in parking lot; lost     Patient Active Problem List    Diagnosis Date Noted    AMS (altered mental status) 2020        Past Medical History:   Diagnosis Date    Hypertension      History reviewed. No pertinent surgical history. Restrictions  Restrictions/Precautions: Fall Risk    SUBJECTIVE   General  Chart Reviewed: Yes  Response To Previous Treatment: Patient with no complaints from previous session. Family / Caregiver Present: No  Subjective  Subjective: I don't really want to do this    Pre-Session Pain Report     Pain Screening  Patient Currently in Pain: Denies  Pre Treatment Pain Screening  Pain at present: 0  Scale Used: Numeric Score  Intervention List: Patient able to continue with treatment    Post-Session Pain Report  Pain Assessment  Pain Assessment: 0-10  Pain Level: 0     OBJECTIVE      Bed mobility  Bridging: Stand by assistance  Rolling to Left: Stand by assistance  Rolling to Right: Stand by assistance  Supine to Sit: Stand by assistance  Sit to Supine: Stand by assistance  Scooting: Stand by assistance  Comment: Increased time and effort to complete    Transfers  Sit to Stand: Stand by assistance;Contact guard assistance  Stand to sit: Contact guard assistance    Ambulation  Ambulation?: Yes  More Ambulation?: No  Ambulation 1  Surface: level tile  Device: Single point cane  Assistance: Contact guard assistance  Quality of Gait: unsteady with unilat UE support  Distance: 30' x 2     ASSESSMENT   Body structures, Functions, Activity limitations: Decreased functional mobility ; Decreased cognition;Decreased posture;Decreased endurance;Decreased strength;Decreased balance;Decreased safe awareness  Assessment: Patient requires increased time to complete functional mobility and require multiple verbal cues for sequencing for transfers and gait     Discharge Recommendations:  Continue to assess pending progress, Patient would benefit from continued therapy after discharge    Goals  Long term goals  Long term goal 1: Pt will demonstrate bed mobility indep  Long term goal 2: Pt will demosntrate transfers mod indep with cane  Long term goal 3: Pt will demonstrate amb 150ft mod indep with cane without LOB for safety with functional mobility  Long term goal 4: Pt will demonstrate stair negotiation 2 steps with 1 grab bar mod indep to allow pt to enter and exit his camper    PLAN    Times per week: 3-6        AMPAC (6 CLICK) BASIC MOBILITY  AM-PAC Inpatient Mobility Raw Score : 18     Therapy Time   Individual   Time In 0935   Time Out 0959   Minutes 24      Transfer 14   Gait [address] [place], [place], 12/01/20 at 10:58 AM         Definitions for assistance levels  Independent = pt does not require any physical supervision or assistance from another person for activity completion. Device may be needed.   Stand by assistance = pt requires verbal cues or instructions from another person, close to but not touching, to perform the activity  Minimal assistance= pt performs 75% or more of the activity; assistance is required to complete the activity  Moderate assistance= pt performs 50% of the activity; assistance is required to complete the activity  Maximal assistance = pt performs 25% of the activity; assistance is required to complete the activity  Dependent = pt requires total physical assistance to accomplish the task

## 2020-12-01 NOTE — PROGRESS NOTES
[facility] called. Patient confused and trying to get out of bed. Pt will not follow commands, is being aggressive, trying to swing at staff.

## 2020-12-01 NOTE — PROGRESS NOTES
sodium chloride 0.9 % 100 mL IVPB  500 mg Intravenous Daily Raymundo PRADHAN Sedar,  mL/hr at 12/01/20 1121 500 mg at 12/01/20 1121    dorzolamide (TRUSOPT) 2 % ophthalmic solution 1 drop  1 drop Both Eyes TID Erika Perez Sedar, DO   1 drop at 12/01/20 1119    timolol (TIMOPTIC) 0.5 % ophthalmic solution 1 drop  1 drop Both Eyes BID Erika Perez Sedar, DO   1 drop at 12/01/20 1122    risperiDONE (RISPERDAL) tablet 0.25 mg  0.25 mg Oral Nightly Cesar Christianson MD        haloperidol lactate (HALDOL) injection 2 mg  2 mg Intramuscular Q6H PRN Lalitha Gifford, DO   2 mg at 11/30/20 1734        PHYSICAL EXAM:    BP (!) 180/58   Pulse 76   Temp 97.7 °F (36.5 °C) (Oral)   Resp 18   Ht 5' 9\" (1.753 m)   Wt 160 lb (72.6 kg)   SpO2 99%   BMI 23.63 kg/m²    General Appearance:      Skin:  normal  CVS - Normal sounds, No murmurs , No carotid Bruits  RS -CTA  Abdomen Soft, BS present    Review of Systems   Constitutional: Negative for fever. HENT: Negative for ear pain, tinnitus and trouble swallowing. Eyes: Negative for photophobia and visual disturbance. Respiratory: Negative for choking and shortness of breath. Cardiovascular: Negative for chest pain and palpitations. Gastrointestinal: Negative for nausea and vomiting. Musculoskeletal: Negative for back pain, gait problem, joint swelling, myalgias, neck pain and neck stiffness. Skin: Negative for color change. Allergic/Immunologic: Negative for food allergies. Neurological: Negative for dizziness, tremors, seizures, syncope, facial asymmetry, speech difficulty, weakness, light-headedness, numbness and headaches. Psychiatric/Behavioral: Negative for behavioral problems, confusion, hallucinations and sleep disturbance.       Mental Status Exam:             Level of Alertness:   awake            Orientation:   person,           Attention/Concentration:  normal            Language:  normal      Funduscopic Exam:     Cranial Nerves            Cranial nerve III Pupils:  equal, round, reactive to light      Cranial nerves III, IV, VI           Extraocular Movements: intact      Cranial nerve V           Facial sensation:  intact      Cranial nerve VII           Facial strength: intact      Cranial nerve VIII           Hearing:  intact      Cranial nerve IX           Palate:  intact      Cranial nerve XI         Shoulder shrug:  intact      Cranial nerve XII          Tongue movement:  normal    Motor:    Drift:  absent  Motor exam is symmetrical 5 out of 5 all extremities bilaterally  Tone:  normal  Abnormal Movements:  absent            Sensory:        Pinprick             Right Upper Extremity:  normal             Left Upper Extremity:  normal             Right Lower Extremity:  normal             Left Lower Extremity:  normal           Vibration                         Touch            Proprioception                 Coordination:           Finger/Nose   Right:  normal              Left:  normal          Heel-Knee-Shin                Right:  normal              Left:  normal          Rapid Alternating Movements              Right:  normal              Left:  normal          Gait:                       Casual: Frontal gait disorder is notable                       Romberg:  normal            Reflexes:             Deep Tendon Reflexes:             Reflexes are absent ankles               Plantar response:                Right:  downgoing               Left:  downgoing    Vascular:  Cardiac Exam:  normal         Ct Head Wo Contrast    Result Date: 11/30/2020  CT Brain Contrast medium:  Not utilized. History: Altered mental status Comparison:  July 5, 2019 Findings: Extra-axial spaces:  Normal. Intracranial hemorrhage:  None. Ventricular system: Ventricles mildly enlarged. Sulci mildly prominent. Basal Cisterns:  Normal. Cerebral Parenchyma: Bilateral symmetric periventricular areas decreased attenuation. Midline Shift:  None.  Cerebellum:  Normal.  Paranasal sinuses and mastoid air cells: Mucosal thickening bilateral maxillary sinuses, right greater than left. Visualized Orbits:  Normal.     Impression: Bilateral maxillary sinusitis. All CT scans at this facility use dose modulation, iterative reconstruction, and/or weight based dosing when appropriate to reduce radiation dose to as low as reasonably achievable. Xr Chest Portable    Result Date: 11/30/2020  EXAMINATION: XR CHEST PORTABLE CLINICAL HISTORY: ALTERED MENTAL STATUS COMPARISONS: None available. FINDINGS: Osseous structures intact. Cardiopericardial silhouette normal. Pulmonary vasculature normal. Ill-defined areas of increased opacity are found in the left lower lung, right lung is clear. LEFT LOWER LUNG SUBSEGMENTAL ATELECTASIS/PNEUMONIA. Mri Brain Wo Contrast    Result Date: 11/30/2020  MRI BRAIN WO CONTRAST : 11/30/2020 CLINICAL HISTORY:  amnesia, eval for CVA. COMPARISON: Head CT from earlier 11/30/2020. TECHNIQUE: Multiplanar MR imaging of the head was performed without contrast. FINDINGS: The study is limited by the patient's medical condition and motion. There is no infarct, intracranial hemorrhage, mass effect, midline shift, extra-axial collection, or hydrocephalus. Mild to moderate generalized cerebral volume loss is present, with moderate predominantly supratentorial white matter changes, most consistent with chronic small vessel ischemic disease. NO ACUTE INTRACRANIAL PROCESS IDENTIFIED, WITHIN LIMITS OF THE STUDY. ATROPHIC AND INVOLUTIONAL CHANGES.        Recent Labs     11/30/20  0045 11/30/20  0545 12/01/20  0925   WBC 7.5 6.4 5.9   HGB 12.2* 10.7* 11.0*    264 251     Recent Labs     11/30/20  0045 11/30/20  0545 12/01/20  0925    142 140   K 3.7 3.7 3.6    108* 108*   CO2 22 17* 19*   BUN 29* 26* 21   CREATININE 1.20 1.01 1.07   GLUCOSE 85 79 63*     Recent Labs     11/30/20  0045   BILITOT 0.5   ALKPHOS 57   AST 18   ALT 7     Lab Results   Component Value Date

## 2020-12-02 LAB
ANION GAP SERPL CALCULATED.3IONS-SCNC: 16 MEQ/L (ref 9–15)
BASOPHILS ABSOLUTE: 0.1 K/UL (ref 0–0.2)
BASOPHILS RELATIVE PERCENT: 0.9 %
BUN BLDV-MCNC: 27 MG/DL (ref 8–23)
CALCIUM SERPL-MCNC: 8.6 MG/DL (ref 8.5–9.9)
CHLORIDE BLD-SCNC: 108 MEQ/L (ref 95–107)
CO2: 17 MEQ/L (ref 20–31)
CREAT SERPL-MCNC: 1.12 MG/DL (ref 0.7–1.2)
EOSINOPHILS ABSOLUTE: 0.1 K/UL (ref 0–0.7)
EOSINOPHILS RELATIVE PERCENT: 1.7 %
GFR AFRICAN AMERICAN: >60
GFR NON-AFRICAN AMERICAN: >60
GLUCOSE BLD-MCNC: 58 MG/DL (ref 70–99)
HCT VFR BLD CALC: 36.6 % (ref 42–52)
HEMOGLOBIN: 11.7 G/DL (ref 14–18)
LYMPHOCYTES ABSOLUTE: 1.4 K/UL (ref 1–4.8)
LYMPHOCYTES RELATIVE PERCENT: 21.6 %
MCH RBC QN AUTO: 30.6 PG (ref 27–31.3)
MCHC RBC AUTO-ENTMCNC: 32 % (ref 33–37)
MCV RBC AUTO: 95.8 FL (ref 80–100)
MONOCYTES ABSOLUTE: 0.7 K/UL (ref 0.2–0.8)
MONOCYTES RELATIVE PERCENT: 10.9 %
NEUTROPHILS ABSOLUTE: 4.3 K/UL (ref 1.4–6.5)
NEUTROPHILS RELATIVE PERCENT: 64.9 %
PDW BLD-RTO: 14.3 % (ref 11.5–14.5)
PLATELET # BLD: 252 K/UL (ref 130–400)
POTASSIUM SERPL-SCNC: 3.5 MEQ/L (ref 3.4–4.9)
RBC # BLD: 3.82 M/UL (ref 4.7–6.1)
SODIUM BLD-SCNC: 141 MEQ/L (ref 135–144)
WBC # BLD: 6.6 K/UL (ref 4.8–10.8)

## 2020-12-02 PROCEDURE — 6370000000 HC RX 637 (ALT 250 FOR IP): Performed by: INTERNAL MEDICINE

## 2020-12-02 PROCEDURE — 2580000003 HC RX 258: Performed by: EMERGENCY MEDICINE

## 2020-12-02 PROCEDURE — 6360000002 HC RX W HCPCS: Performed by: INTERNAL MEDICINE

## 2020-12-02 PROCEDURE — 99233 SBSQ HOSP IP/OBS HIGH 50: CPT | Performed by: PSYCHIATRY & NEUROLOGY

## 2020-12-02 PROCEDURE — 6370000000 HC RX 637 (ALT 250 FOR IP): Performed by: PSYCHIATRY & NEUROLOGY

## 2020-12-02 PROCEDURE — 2580000003 HC RX 258: Performed by: INTERNAL MEDICINE

## 2020-12-02 PROCEDURE — 97535 SELF CARE MNGMENT TRAINING: CPT

## 2020-12-02 PROCEDURE — 36415 COLL VENOUS BLD VENIPUNCTURE: CPT

## 2020-12-02 PROCEDURE — 1210000000 HC MED SURG R&B

## 2020-12-02 PROCEDURE — APPSS30 APP SPLIT SHARED TIME 16-30 MINUTES: Performed by: NURSE PRACTITIONER

## 2020-12-02 PROCEDURE — 85025 COMPLETE CBC W/AUTO DIFF WBC: CPT

## 2020-12-02 PROCEDURE — 80048 BASIC METABOLIC PNL TOTAL CA: CPT

## 2020-12-02 PROCEDURE — 97129 THER IVNTJ 1ST 15 MIN: CPT

## 2020-12-02 PROCEDURE — 97116 GAIT TRAINING THERAPY: CPT

## 2020-12-02 RX ADMIN — MEMANTINE HYDROCHLORIDE 10 MG: 10 TABLET ORAL at 08:55

## 2020-12-02 RX ADMIN — DORZOLAMIDE HYDROCHLORIDE 1 DROP: 20 SOLUTION/ DROPS OPHTHALMIC at 16:27

## 2020-12-02 RX ADMIN — DORZOLAMIDE HYDROCHLORIDE 1 DROP: 20 SOLUTION/ DROPS OPHTHALMIC at 08:55

## 2020-12-02 RX ADMIN — Medication 10 ML: at 08:55

## 2020-12-02 RX ADMIN — TIMOLOL MALEATE 1 DROP: 5 SOLUTION/ DROPS OPHTHALMIC at 08:55

## 2020-12-02 RX ADMIN — LOSARTAN POTASSIUM 50 MG: 50 TABLET, FILM COATED ORAL at 08:55

## 2020-12-02 RX ADMIN — ASPIRIN 81 MG: 81 TABLET, COATED ORAL at 08:55

## 2020-12-02 RX ADMIN — THIAMINE HYDROCHLORIDE 500 MG: 100 INJECTION, SOLUTION INTRAMUSCULAR; INTRAVENOUS at 16:28

## 2020-12-02 RX ADMIN — Medication 3 ML: at 08:56

## 2020-12-02 RX ADMIN — Medication 3 ML: at 16:28

## 2020-12-02 RX ADMIN — HALOPERIDOL LACTATE 2 MG: 5 INJECTION, SOLUTION INTRAMUSCULAR at 19:10

## 2020-12-02 RX ADMIN — ENOXAPARIN SODIUM 40 MG: 40 INJECTION SUBCUTANEOUS at 08:55

## 2020-12-02 ASSESSMENT — ENCOUNTER SYMPTOMS
NAUSEA: 0
TROUBLE SWALLOWING: 0
PHOTOPHOBIA: 0
CHOKING: 0
COLOR CHANGE: 0
VOMITING: 0
SHORTNESS OF BREATH: 0
BACK PAIN: 0

## 2020-12-02 ASSESSMENT — PAIN SCALES - GENERAL: PAINLEVEL_OUTOF10: 0

## 2020-12-02 NOTE — DISCHARGE INSTR - COC
Continuity of Care Form    Patient Name: Yon Diaz   :  1933  MRN:  [id number]    Admit date:  2020  Discharge date:  20    Code Status Order: Full Code   Advance Directives:     Admitting Physician:  Aliya Logan DO  PCP: No primary care provider on file. Discharging Nurse: [facility] Unit/Room#: H842/H107-33  Discharging Unit Phone Number: ***    Emergency Contact:   Extended Emergency Contact Information  Primary Emergency Contact: one,no  Home Phone: [phone]  Relation: Other    Past Surgical History:  History reviewed. No pertinent surgical history. Immunization History: There is no immunization history on file for this patient. Active Problems:  Patient Active Problem List   Diagnosis Code    AMS (altered mental status) R41.82    Encephalopathy acute G93.40       Isolation/Infection:   Isolation          No Isolation        Patient Infection Status     None to display          Nurse Assessment:  Last Vital Signs: BP (!) 151/59   Pulse 82   Temp 97.2 °F (36.2 °C) (Axillary)   Resp 16   Ht 5' 9\" (1.753 m)   Wt 160 lb (72.6 kg)   SpO2 100%   BMI 23.63 kg/m²     Last documented pain score (0-10 scale): Pain Level: 0  Last Weight:   Wt Readings from Last 1 Encounters:   20 160 lb (72.6 kg)     Mental Status:  disoriented, alert and coherent    IV Access:  - None    Nursing Mobility/ADLs:  Walking   Assisted  Transfer  Assisted  Bathing  Assisted  Dressing  Assisted  Toileting  Assisted  Feeding  Independent after set up  Med Admin  Assisted  Med Delivery   whole    Wound Care Documentation and Therapy:        Elimination:  Continence:   · Bowel: Yes  · Bladder: Yes  Urinary Catheter: None   Colostomy/Ileostomy/Ileal Conduit: no       Date of Last BM: ***  No intake or output data in the 24 hours ending 20 1756  No intake/output data recorded.     Safety Concerns:     Sund[place], At Risk for Falls and History of Seizures    Impairments/Disabilities:      Vision and Hearing    Nutrition Therapy:  Current Nutrition Therapy:   - Oral Diet:  General    Routes of Feeding: Oral  Liquids: Thin  Daily Fluid Restriction: no  Last Modified Barium Swallow with Video (Video Swallowing Test): not done    Treatments at the Time of Hospital Discharge:   Respiratory Treatments: ***  Oxygen Therapy:  is not on home oxygen therapy. Ventilator:    - No ventilator support    Rehab Therapies: {THERAPEUTIC INTERVENTION:0492081966}  Weight Bearing Status/Restrictions: No weight bearing restirctions  Other Medical Equipment (for information only, NOT a DME order):  {EQUIPMENT:755184664}  Other Treatments: ***    Patient's personal belongings (please select all that are sent with patient):  Glasses, Clothing and [address] In Safe with Security- Son took his keys to Taryn SCANLON SIGNATURE:  {Esignature:[id number]}    CASE MANAGEMENT/SOCIAL WORK SECTION    Inpatient Status Date: 12/01/2020    Readmission Risk Assessment Score:  Readmission Risk              Risk of Unplanned Readmission:        11           Discharging to Facility/ Agency   · Name:   · Address:  · Phone:  · Fax:    Dialysis Facility (if applicable)   · Name:  · Address:  · Dialysis Schedule:  · Phone:  · Fax:    / signature: Electronically signed by NANCY Hernandez on 12/2/20 at 5:56 PM EST    PHYSICIAN SECTION    Prognosis: {Prognosis:1757835378}    Condition at Discharge: [address] Patient Condition:174235930}    Rehab Potential (if transferring to Rehab): {Prognosis:4891361508}    Recommended Labs or Other Treatments After Discharge: ***    Physician Certification: I certify the above information and transfer of Claribel Suh  is necessary for the continuing treatment of the diagnosis listed and that he requires {Admit to Appropriate Level of Care:43628} for {GREATER/LESS:230662375} 30 days.      Update Admission H&P: {CHP DME Changes in HSBKW:453779332}    PHYSICIAN SIGNATURE:  {Esignature:[id number]}

## 2020-12-02 NOTE — PROGRESS NOTES
Physical Therapy Med Surg Daily Treatment Note  Facility/Department: [facility]  Room: E6/W728-73       NAME: Norma Villa  : 1933 (80 y.o.)  MRN: [id number]  CODE STATUS: Full Code    Date of Service: 2020    Patient Diagnosis(es): AMS (altered mental status) [R41.82]  Encephalopathy acute [G93.40]   Chief Complaint   Patient presents with    Altered Mental Status     Pt found in  camper in parking lot; lost     Patient Active Problem List    Diagnosis Date Noted    Encephalopathy acute 2020    AMS (altered mental status) 2020        Past Medical History:   Diagnosis Date    Hypertension      History reviewed. No pertinent surgical history. Restrictions  Restrictions/Precautions: Fall Risk    SUBJECTIVE   Subjective  Subjective: I guess I can work with you. Pre-Session Pain Report  Pre Treatment Pain Screening  Pain at present: 0  Intervention List: Patient able to continue with treatment          Post-Session Pain Report  Pain Assessment  Pain Level: 0         OBJECTIVE   Orientation  Overall Orientation Status: Within Functional Limits    Bed mobility  Rolling to Right: Stand by assistance  Supine to Sit: Contact guard assistance;Stand by assistance  Comment: bed flat with minimal use of hand rails; increased time to complete    Transfers  Sit to Stand: Contact guard assistance;Stand by assistance  Stand to sit: Stand by assistance;Contact guard assistance  Bed to Chair: Stand by assistance;Contact guard assistance  Comment: vc's for hand placement and technique with SC; FF posture upon standing, increased time to get fully upright. Ambulation 1  Surface: level tile  Device: Single point cane  Assistance: Contact guard assistance  Quality of Gait: NBOS with shortened step length, difficulty with depth perception which causes him to step cautiously when floor changes colors. mild unsteady d/t vision deficits.   Gait Deviations: Slow Radha  Distance: 50'x2  Comments: Pt states he uses a [address] all the time. Activity Tolerance  Activity Tolerance: Patient Tolerated treatment well          ASSESSMENT   Assessment: good participation; Pt able to increase ambulatory distance without complaint. Pt has a depth perception issue and is slightly unsteady with floor color changes d/t fear. Discharge Recommendations:  Continue to assess pending progress, Patient would benefit from continued therapy after discharge    Goals  Long term goals  Long term goal 1: Pt will demonstrate bed mobility indep  Long term goal 2: Pt will demosntrate transfers mod indep with cane  Long term goal 3: Pt will demonstrate amb 150ft mod indep with cane without LOB for safety with functional mobility  Long term goal 4: Pt will demonstrate stair negotiation 2 steps with 1 grab bar mod indep to allow pt to enter and exit his camper    PLAN    Safety Devices  Type of devices: Call light within reach; Chair alarm in place; Left in chair; All fall risk precautions in place     AMPAC (6 CLICK) BASIC MOBILITY  AM-PAC Inpatient Mobility Raw Score : 18      Therapy Time   Individual   Time In 1453   Time Out 1516   Minutes 23      bm/Trsf - 10 mins  Gait - 13 mins       Brittnee Saucedo PTA, 12/02/20 at 3:26 PM       Definitions for assistance levels  Independent = pt does not require any physical supervision or assistance from another person for activity completion. Device may be needed.   Stand by assistance = pt requires verbal cues or instructions from another person, close to but not touching, to perform the activity  Minimal assistance= pt performs 75% or more of the activity; assistance is required to complete the activity  Moderate assistance= pt performs 50% of the activity; assistance is required to complete the activity  Maximal assistance = pt performs 25% of the activity; assistance is required to complete the activity  Dependent = pt requires total physical assistance to accomplish the task

## 2020-12-02 NOTE — FLOWSHEET NOTE
In room to check on patient and he is irritated at this time sitting on edge of bed. Informed patient we would like to change his sheets because there is urine on his sheets. Patient is arguing that he does not need to move and leave him alone. Notified associate mgr [facility] JESUS and he came to room to speak with patient. The patient then was agreeable to go to chair to clean bed. Placed patient back to bed with two assist afterwards, his gait is very unsteady. Patient now resting comfortably, will continue to monitor.

## 2020-12-02 NOTE — PROGRESS NOTES
Physical Therapy Missed Treatment   Facility/Department: [facility] Z864/E335-90    NAME: Caroline Denny    : 1933 (80 y.o.)  MRN: [id number]    Account: [de-identified]  Gender: male    Patient eating breakfast upon therapist arrival. Agreeable to complete therapy when he finishes breakfast.    Isidra Walsh PTA, 20 at 9:24 AM

## 2020-12-02 NOTE — PROGRESS NOTES
[facility]   Facility/Department: [facility]  Speech Language Pathology  Treatment Note  Evelin Kee  6/23/1933  W703/R066-79    Medical Dx: AMS (altered mental status) [R41.82]  Encephalopathy acute [G93.40]  Speech Dx: Cognitive Impairment        12/2/2020      Subjective:  Alert, Pleasant, Confused and Other: impulsive   SLP continued to provide redirection and rationale for hospital stay. Pt continued to att to get out of bed and stated \"they did this\"  RN notified about impulsive behavior. Interventions used this date:  Cognitive Skill Development    Objective/Assessment:  Patient progressing towards goals:  Short-term Goals  Timeframe for Short-term Goals: 2 weeks  Goal 1: To increase safety awareness and judgment for safe completion of ADLs secondary to pt's cognitive deficits,  pt will complete basic level problem solving tasks related to ADLs (e.g. finances and medication) with 70% accuracy and min cues. Not addressed   Goal 2: To increase safety awareness and judgment for safe completion of ADLs secondary to pt's cognitive deficits, pt will provide reasonable solutions to problems of everyday living with 80% accuracy and min cues. Pt unable to complete problem solving with max cues. SLP educated pt on call light at beginning of session and pt was unable to recall purpose at end of session. Goal 3: To increase safety awareness and judgment for safe completion of ADLs secondary to pt's cognitive deficits, pt will complete abstract reasoning tasks (i.e. Word deduction, convergent and divergent naming, similarities/differences) with 80% accuracy and min cues. Pt was able to complete convergent naming tasks with 10% acc gvien max cues. Pt continue to perseverate on getting out of bed and looking \"in\" different objects in room. Max redirection required. Goal 4:  To address pt's cognitive deficits and promote orientation, pt will state name of facility, time within 1 hour, reason in hospital, current month and year with 100% accuracy with min assist, with use of external aid. Pt was unable to state location, time, date, or reason for stay. Pt continued to state it was \"5076\" and that he was at home. After max cues, pt was unable to state location, or reason. Pt was very impulsive and continued to try and get out of bed. Pt continued to point to this and ask for the SLP to open them (most objects were unable to be opened). Tangential speech and perservations noted. Goal 5: To address pt's cognitive deficits and promote his/her ability to safely follow directives in a variety of environments, pt will carry out verbal directives of increasing complexity in everyday activities with 80% accuracy supervision cues. Unable to follow simple 1 step directions this date. Goal 6: To decrease pt's cognitive deficits through the use of compensatory strategies, the pt will be educated on 3 different memory strategies and verbalize how he/she might use them at home in 3 ways with min cues. Not addressed     Long-term Goals  Timeframe for Long-term Goals: 2 weeks  Goal 1: Pt will improve his Cognition from severe assist to min to  mod for adequate functional recall and safety awareness for home. Short-term Goals  Timeframe for Short-term Goals: 1 week  Goal 1: Pt will tolerate regular solids with thin liquids without overt s/s of aspiration with 100% accuracy  Compensatory Swallowing Strategies: Small bites/sips, Alternate solids and liquids, Upright as possible for all oral intake      Treatment/Activity Tolerance:  Patient limited by other medical complications and Other: limited by impulsive behavior and perseverations     Plan:  Continue per POC    Pain Assessment:  Initial Assessment:  Patient demonstrated no s/s of pain. Re-assessment:  Patient demonstrated no s/s of pain. Patient/Caregiver Education:  No education provided at this time. Safety Devices:   All fall risk precautions in place, Bed alarm in place, Call light within reach and [employer]    Therapy Time  SLP Individual Minutes  Time In: 1111  Time Out: 1 Liang Montague  Minutes: 19            Signature: Electronically signed by BRUNO Houston on 12/2/2020 at 11:43 AM

## 2020-12-02 NOTE — PROGRESS NOTES
Patient has had bouts of agitation overnight. He has been extremely confused with no sense of reality. He is not making any sense at this time. Patient attempted to drink out of his urinal thinking it was a cup. Patient allowed staff to change his sheets but would not allow anyone to clean him up.

## 2020-12-02 NOTE — PROGRESS NOTES
Spoke with GHISLAINE Cardoso from [place]; she is going to fax the patients home med list, problem list, and medical information that may be helpful for his care.

## 2020-12-02 NOTE — PROGRESS NOTES
Department of Internal Medicine  General Internal Medicine  Attending Progress Note      SUBJECTIVE:  Pt seen and examined. MRI negative. Gets agitated at night with sundowning episodes. No complaints today. Still oriented only to self. OBJECTIVE      Medications    Current Facility-Administered Medications: memantine (NAMENDA) tablet 10 mg, 10 mg, Oral, BID  [COMPLETED] Saline lock IV, , , Continuous **AND** sodium chloride flush 0.9 % injection 3 mL, 3 mL, Intravenous, Q8H  sodium chloride flush 0.9 % injection 10 mL, 10 mL, Intravenous, 2 times per day  sodium chloride flush 0.9 % injection 10 mL, 10 mL, Intravenous, PRN  acetaminophen (TYLENOL) tablet 650 mg, 650 mg, Oral, Q6H PRN **OR** acetaminophen (TYLENOL) suppository 650 mg, 650 mg, Rectal, Q6H PRN  polyethylene glycol (GLYCOLAX) packet 17 g, 17 g, Oral, Daily PRN  promethazine (PHENERGAN) tablet 12.5 mg, 12.5 mg, Oral, Q6H PRN **OR** ondansetron (ZOFRAN) injection 4 mg, 4 mg, Intravenous, Q6H PRN  enoxaparin (LOVENOX) injection 40 mg, 40 mg, Subcutaneous, Daily  labetalol (NORMODYNE;TRANDATE) injection 10 mg, 10 mg, Intravenous, Q6H PRN  losartan (COZAAR) tablet 50 mg, 50 mg, Oral, Daily  aspirin EC tablet 81 mg, 81 mg, Oral, Daily  rosuvastatin (CRESTOR) tablet 20 mg, 20 mg, Oral, Nightly  thiamine (B-1) 500 mg in sodium chloride 0.9 % 100 mL IVPB, 500 mg, Intravenous, Daily  dorzolamide (TRUSOPT) 2 % ophthalmic solution 1 drop, 1 drop, Both Eyes, TID  timolol (TIMOPTIC) 0.5 % ophthalmic solution 1 drop, 1 drop, Both Eyes, BID  risperiDONE (RISPERDAL) tablet 0.25 mg, 0.25 mg, Oral, Nightly  haloperidol lactate (HALDOL) injection 2 mg, 2 mg, Intramuscular, Q6H PRN  Physical    VITALS:  BP (!) 151/59   Pulse 82   Temp 97.2 °F (36.2 °C) (Axillary)   Resp 16   Ht 5' 9\" (1.753 m)   Wt 160 lb (72.6 kg)   SpO2 100%   BMI 23.63 kg/m²   Constitutional: Oriented only to self.  Sitting in chair comfortably  Head: Normocephalic, atraumatic  Eyes: EOMI,

## 2020-12-02 NOTE — PROGRESS NOTES
Physician Progress Note      Katelynn Barnes  CSN #:                  [id number]  :                       1933  ADMIT DATE:       2020 12:25 AM  DISCH DATE:  RESPONDING  PROVIDER #:        Helio Tsai DO          QUERY TEXT:    Pt admitted with AMS and has Alzheimer's dementia documented. Pt noted to be   uncooperative, agitated, combative, and aggressive. If possible, please   document in the progress notes and discharge summary if you are evaluating and   / or treating any of the following: The medical record reflects the following:  Risk Factors: Alzheimer's dementia  Clinical Indicators: AMS, AAOx self only, confused, disoriented,   uncooperative, combative, agitated, aggressive   nursing documents pt is confused, agitated, aggressive, attempting to   swing at staff   Dr. Lara-Alzheimer's dementia with a slow decline. ..some degree of   atrophy with minor small vessel ischemic changes may explain his memory as   well as gait issues. MRI brain-no acute findings, chronic small vessel ischemic disease. Treatment: Namenda, Haldol, neurology/psych consult, close observation,   Avasys,    Thank you, Ton Lee RN BSN [facility]  [phone]  Options provided:  -- Alzheimer's Dementia with behavioral disturbance  -- Alzheimer's Dementia without behavioral disturbance  -- Other - I will add my own diagnosis  -- Disagree - Not applicable / Not valid  -- Disagree - Clinically unable to determine / Unknown  -- Refer to Clinical Documentation Reviewer    PROVIDER RESPONSE TEXT:    This patient has Alzheimer's dementia with behavioral disturbances. Query created by: Johnnie Newsome on 2020 8:08 AM      QUERY TEXT:    Dear attending. Patient admitted with AMS found to have Alzheimer's dementia. Noted   documentation of encephalopathy per attending; no metabolic, toxic, or   infectious cause identified.  If possible, please document in progress notes   and discharge summary:    The medical record reflects the following:  Risk Factors: Alzheimer's dementia  Clinical Indicators:  12/2 nursing documents pt is confused, agitated, aggressive, attempting to   swing at staff  12/1 Dr. Doni Hernandes - possibly related to undiagnosed dementia  12/1 Dr. Ocampo Early dementia with a slow decline. H&P-Altered mental status: Unable to determine whether this is acute metabolic   encephalopathy versus toxic encephalopathy versus organic brain disease. MRI brain-no acute findings, chronic small vessel ischemic disease. CT head is   negative. Electrolytes are normal, no metabolic acidosis, liver profile is   normal, WBC 6.4/5.9/6.6, no infection, drug screen is negative. Treatment: Namenda, Haldol, neurology/psych consult, close observation,   Avasys,    Thank you, Dulce Lewis RN BSN [facility]  [phone]  Options provided:  -- AMS due to Alzheimer's dementia confirmed and encephalopathy ruled out  -- Encephalopathy present as evidenced by, Please document evidence. -- Other - I will add my own diagnosis  -- Disagree - Not applicable / Not valid  -- Disagree - Clinically unable to determine / Unknown  -- Refer to Clinical Documentation Reviewer    PROVIDER RESPONSE TEXT:    After study, AMS due to Alzheimer's dementia confirmed and encephalopathy   ruled out.     Query created by: Yumiko Gonzales on 12/2/2020 8:29 AM      Electronically signed by:  Yassine Loyd DO 12/2/2020 3:03 PM

## 2020-12-02 NOTE — PROGRESS NOTES
Neurology Follow up    SUBJECTIVE: ROS - NO Headache, double vision,blurry vision,difficulty with speech,difficulty with swallowing,weakness,numbness,pain,nausea,vomitting,chocking,neck pain,dizziness,  Patient denies any back pain neck pain    Patient appears to be sleeping does awaken quite pleasant though disoriented. He had some issues at night regarding agitation and he sundown's. He still has no recall is in the hospital but is [place] We will still not able to find his son or friends for further details      Patient seen and examined for neurology follow-up for encephalopathy with underlying dementia and behavioral disturbances. Currently alert and oriented x1, no acute distress, cooperative. Patient did have significant behavioral disturbances overnight. No focal neuro deficits.     Pt sen and examned,  Ate well,  No agitaion  Current Facility-Administered Medications   Medication Dose Route Frequency Provider Last Rate Last Dose    memantine (NAMENDA) tablet 10 mg  10 mg Oral BID Brock Jacinto MD   10 mg at 12/02/20 0855    sodium chloride flush 0.9 % injection 3 mL  3 mL Intravenous Q8H Crystal Hankins, DO   3 mL at 12/02/20 0856    sodium chloride flush 0.9 % injection 10 mL  10 mL Intravenous 2 times per day Poornima PRADHAN Sedar, DO   10 mL at 12/02/20 0855    sodium chloride flush 0.9 % injection 10 mL  10 mL Intravenous PRN Yenifer Gains Sedar, DO        acetaminophen (TYLENOL) tablet 650 mg  650 mg Oral Q6H PRN Yenifer Gains Sedar, DO        Or    acetaminophen (TYLENOL) suppository 650 mg  650 mg Rectal Q6H PRN Yenifer Gains Sedar, DO        polyethylene glycol (GLYCOLAX) packet 17 g  17 g Oral Daily PRN Yenifer Gains Sedar, DO        promethazine (PHENERGAN) tablet 12.5 mg  12.5 mg Oral Q6H PRN Yenifer Gains Sedar, DO        Or    ondansetron (ZOFRAN) injection 4 mg  4 mg Intravenous Q6H PRN Yenifer Gains Sedar, DO        enoxaparin (LOVENOX) injection 40 mg  40 mg Subcutaneous Daily Yenifer Gains Sedar, DO 40 mg at 12/02/20 0855    labetalol (NORMODYNE;TRANDATE) injection 10 mg  10 mg Intravenous Q6H PRN Launie Macleod Sedar, DO        losartan (COZAAR) tablet 50 mg  50 mg Oral Daily Launie Macleod Sedar, DO   50 mg at 12/02/20 0855    aspirin EC tablet 81 mg  81 mg Oral Daily Launie Macleod Sedar, DO   81 mg at 12/02/20 0855    rosuvastatin (CRESTOR) tablet 20 mg  20 mg Oral Nightly Launie Macleod Sedar, DO   20 mg at 12/01/20 2135    thiamine (B-1) 500 mg in sodium chloride 0.9 % 100 mL IVPB  500 mg Intravenous Daily Launie Macleod Sedar, DO   Stopped at 12/01/20 1326    dorzolamide (TRUSOPT) 2 % ophthalmic solution 1 drop  1 drop Both Eyes TID Launie Macleod Sedar, DO   1 drop at 12/02/20 0855    timolol (TIMOPTIC) 0.5 % ophthalmic solution 1 drop  1 drop Both Eyes BID Launie Macleod Sedar, DO   1 drop at 12/02/20 0855    risperiDONE (RISPERDAL) tablet 0.25 mg  0.25 mg Oral Nightly Adonis Camacho MD   0.25 mg at 12/01/20 2134    haloperidol lactate (HALDOL) injection 2 mg  2 mg Intramuscular Q6H PRN Kris Persons, DO   2 mg at 11/30/20 1734        PHYSICAL EXAM:    BP (!) 151/59   Pulse 86   Temp 97.2 °F (36.2 °C) (Axillary)   Resp 16   Ht 5' 9\" (1.753 m)   Wt 160 lb (72.6 kg)   SpO2 100%   BMI 23.63 kg/m²    General Appearance:      Skin:  normal  CVS - Normal sounds, No murmurs , No carotid Bruits  RS -CTA  Abdomen Soft, BS present    Review of Systems   Unable to perform ROS: Mental status change   Constitutional: Negative for fever. HENT: Negative for ear pain, tinnitus and trouble swallowing. Eyes: Negative for photophobia and visual disturbance. Respiratory: Negative for choking and shortness of breath. Cardiovascular: Negative for chest pain and palpitations. Gastrointestinal: Negative for nausea and vomiting. Musculoskeletal: Negative for back pain, gait problem, joint swelling, myalgias, neck pain and neck stiffness. Skin: Negative for color change.    Neurological: Negative for dizziness, tremors, seizures, syncope, facial asymmetry, speech difficulty, weakness, light-headedness, numbness and headaches. Psychiatric/Behavioral: Positive for confusion. Negative for behavioral problems, hallucinations and sleep disturbance.     pleasant but confused  Mental Status Exam:             Level of Alertness:   awake            Orientation:   person,           Attention/Concentration:  normal            Language:  normal      Funduscopic Exam:     Cranial Nerves            Cranial nerve III           Pupils:  equal, round, reactive to light      Cranial nerves III, IV, VI           Extraocular Movements: intact      Cranial nerve V           Facial sensation:  intact      Cranial nerve VII           Facial strength: intact      Cranial nerve VIII           Hearing:  intact      Cranial nerve IX           Palate:  intact      Cranial nerve XI         Shoulder shrug:  intact      Cranial nerve XII          Tongue movement:  normal    Motor:    Drift:  absent  Motor exam is symmetrical 5 out of 5 all extremities bilaterally  Tone:  normal  Abnormal Movements:  absent            Sensory:        Pinprick             Right Upper Extremity:  normal             Left Upper Extremity:  normal             Right Lower Extremity:  normal             Left Lower Extremity:  normal           Vibration                         Touch            Proprioception                 Coordination:           Finger/Nose   Right:  normal              Left:  normal          Heel-Knee-Shin                Right:  normal              Left:  normal          Rapid Alternating Movements              Right:  normal              Left:  normal          Gait:                       Casual: Frontal gait disorder is notable                       Romberg:  normal            Reflexes:             Deep Tendon Reflexes:             Reflexes are absent ankles               Plantar response:                Right:  downgoing               Left:  downgoing    Vascular:  Cardiac Exam:  normal Ct Head Wo Contrast    Result Date: 11/30/2020  CT Brain Contrast medium:  Not utilized. History: Altered mental status Comparison:  July 5, 2019 Findings: Extra-axial spaces:  Normal. Intracranial hemorrhage:  None. Ventricular system: Ventricles mildly enlarged. Sulci mildly prominent. Basal Cisterns:  Normal. Cerebral Parenchyma: Bilateral symmetric periventricular areas decreased attenuation. Midline Shift:  None. Cerebellum:  Normal.  Paranasal sinuses and mastoid air cells: Mucosal thickening bilateral maxillary sinuses, right greater than left. Visualized Orbits:  Normal.     Impression: Bilateral maxillary sinusitis. All CT scans at this facility use dose modulation, iterative reconstruction, and/or weight based dosing when appropriate to reduce radiation dose to as low as reasonably achievable. Xr Chest Portable    Result Date: 11/30/2020  EXAMINATION: XR CHEST PORTABLE CLINICAL HISTORY: ALTERED MENTAL STATUS COMPARISONS: None available. FINDINGS: Osseous structures intact. Cardiopericardial silhouette normal. Pulmonary vasculature normal. Ill-defined areas of increased opacity are found in the left lower lung, right lung is clear. LEFT LOWER LUNG SUBSEGMENTAL ATELECTASIS/PNEUMONIA. Mri Brain Wo Contrast    Result Date: 11/30/2020  MRI BRAIN WO CONTRAST : 11/30/2020 CLINICAL HISTORY:  amnesia, eval for CVA. COMPARISON: Head CT from earlier 11/30/2020. TECHNIQUE: Multiplanar MR imaging of the head was performed without contrast. FINDINGS: The study is limited by the patient's medical condition and motion. There is no infarct, intracranial hemorrhage, mass effect, midline shift, extra-axial collection, or hydrocephalus. Mild to moderate generalized cerebral volume loss is present, with moderate predominantly supratentorial white matter changes, most consistent with chronic small vessel ischemic disease. NO ACUTE INTRACRANIAL PROCESS IDENTIFIED, WITHIN LIMITS OF THE STUDY.  ATROPHIC AND INVOLUTIONAL CHANGES. Recent Labs     11/30/20  0545 12/01/20  0925 12/02/20  0555   WBC 6.4 5.9 6.6   HGB 10.7* 11.0* 11.7*    251 252     Recent Labs     11/30/20  0545 12/01/20  0925 12/02/20  0555    140 141   K 3.7 3.6 3.5   * 108* 108*   CO2 17* 19* 17*   BUN 26* 21 27*   CREATININE 1.01 1.07 1.12   GLUCOSE 79 63* 58*     Recent Labs     11/30/20  0045   BILITOT 0.5   ALKPHOS 57   AST 18   ALT 7     Lab Results   Component Value Date    PROTIME 11.1 07/05/2019    INR 1.1 07/05/2019     No results found for: LITHIUM, DILFRTOT, VALPROATE    ASSESSMENT AND PLAN  Alzheimer's dementia with a slow decline. Witness account is still not available we tried to contact his son who is in [place] we were not able to reach him and we tried to find some friends who can give us more insight but no one can give us any insight to his progressive memory issues and how he functions. Safety issues other for concern and will relate this to social workers regarding placement. MRI of the brain was reviewed personally there appears to be some degree of atrophy with minor small vessel ischemic changes may explain his memory as well as gait issues. We will start him on Namenda now as he has had some sundowning and agitation this may help. We will continue to keep him under observation and treat his behaviors and sundowning which again fall into a neurological disorder. Pt seen and he slept well  He is not concerned about being here and not sure why he is here      Awaiting psych evaluation for competency  Plan to DC to skilled nursing facility  PT/OT eval  EEG completed  TSH, B12 and folate within normal limits  Spoke with care coordinator and nursing. It appears patient is somewhat estranged from his family and baseline mental status is hard to determine at this time.     I have personally performed a face to face diagnostic evaluation on this patient, reviewed all data and investigations, and am the sole provider of all clinical decisions on the neurological status of this patient. My exam shows a confused pleasant pt ,  A pure dementia  Recommended he have physical therapy        [facility]. Pamela Talley MD, Emmett Graham, American Board of Psychiatry & Neurology  Board Certified in Vascular Neurology  Board Certified in Neuromuscular Medicine  Certified in [address] [place] [address].  Pamela Talley MD, Emmett Graham, American Board of Psychiatry & Neurology  Board Certified in Vascular Neurology  Board Certified in Neuromuscular Medicine  Certified in . [place] [id number]

## 2020-12-02 NOTE — PLAN OF CARE
Problem: Skin Integrity:  Goal: Will show no infection signs and symptoms  Description: Will show no infection signs and symptoms  Outcome: Ongoing  Goal: Absence of new skin breakdown  Description: Absence of new skin breakdown  Outcome: Ongoing     Problem: Falls - Risk of:  Goal: Will remain free from falls  Description: Will remain free from falls  Outcome: Ongoing  Goal: Absence of physical injury  Description: Absence of physical injury  Outcome: Ongoing     Problem: IP SWALLOWING  Goal: LTG - patient will tolerate the least restrictive diet consistency to allow for safe consumption of daily meals  Outcome: Ongoing     Problem: IP BALANCE  Goal: LTG - patient will maintain standing balance to allow for completion of daily activities  Outcome: Ongoing     Problem: IP COMMUNICATION/DYSARTHRIA  Goal: LTG - patient will improve expressive language skills to allow for communication of wants and needs in daily activities  Outcome: Ongoing   Plan of care continues

## 2020-12-03 ENCOUNTER — APPOINTMENT (OUTPATIENT)
Dept: GENERAL RADIOLOGY | Age: 85
DRG: 057 | End: 2020-12-03
Payer: MEDICARE

## 2020-12-03 LAB
EKG ATRIAL RATE: 82 BPM
EKG P AXIS: 68 DEGREES
EKG P-R INTERVAL: 134 MS
EKG Q-T INTERVAL: 404 MS
EKG QRS DURATION: 86 MS
EKG QTC CALCULATION (BAZETT): 472 MS
EKG R AXIS: 72 DEGREES
EKG T AXIS: 62 DEGREES
EKG VENTRICULAR RATE: 82 BPM

## 2020-12-03 PROCEDURE — 6370000000 HC RX 637 (ALT 250 FOR IP): Performed by: PSYCHIATRY & NEUROLOGY

## 2020-12-03 PROCEDURE — 6360000002 HC RX W HCPCS: Performed by: INTERNAL MEDICINE

## 2020-12-03 PROCEDURE — 93005 ELECTROCARDIOGRAM TRACING: CPT | Performed by: INTERNAL MEDICINE

## 2020-12-03 PROCEDURE — 99232 SBSQ HOSP IP/OBS MODERATE 35: CPT | Performed by: PSYCHIATRY & NEUROLOGY

## 2020-12-03 PROCEDURE — 97535 SELF CARE MNGMENT TRAINING: CPT

## 2020-12-03 PROCEDURE — 71045 X-RAY EXAM CHEST 1 VIEW: CPT

## 2020-12-03 PROCEDURE — APPSS15 APP SPLIT SHARED TIME 0-15 MINUTES: Performed by: NURSE PRACTITIONER

## 2020-12-03 PROCEDURE — 2580000003 HC RX 258: Performed by: INTERNAL MEDICINE

## 2020-12-03 PROCEDURE — 6370000000 HC RX 637 (ALT 250 FOR IP): Performed by: INTERNAL MEDICINE

## 2020-12-03 PROCEDURE — 1210000000 HC MED SURG R&B

## 2020-12-03 PROCEDURE — 2580000003 HC RX 258: Performed by: EMERGENCY MEDICINE

## 2020-12-03 RX ORDER — RISPERIDONE 0.25 MG/1
0.25 TABLET, FILM COATED ORAL NIGHTLY
Qty: 60 TABLET | Refills: 3 | Status: SHIPPED | OUTPATIENT
Start: 2020-12-03

## 2020-12-03 RX ORDER — ASPIRIN 81 MG/1
81 TABLET ORAL DAILY
Qty: 30 TABLET | Refills: 3 | Status: SHIPPED | OUTPATIENT
Start: 2020-12-04

## 2020-12-03 RX ORDER — ROSUVASTATIN CALCIUM 20 MG/1
20 TABLET, COATED ORAL NIGHTLY
Qty: 30 TABLET | Refills: 3 | Status: SHIPPED | OUTPATIENT
Start: 2020-12-03

## 2020-12-03 RX ORDER — DORZOLAMIDE HCL 20 MG/ML
1 SOLUTION/ DROPS OPHTHALMIC 3 TIMES DAILY
Qty: 10 ML | Refills: 0 | Status: SHIPPED | OUTPATIENT
Start: 2020-12-03

## 2020-12-03 RX ORDER — MEMANTINE HYDROCHLORIDE 10 MG/1
10 TABLET ORAL 2 TIMES DAILY
Qty: 60 TABLET | Refills: 3 | Status: SHIPPED | OUTPATIENT
Start: 2020-12-03

## 2020-12-03 RX ORDER — TIMOLOL MALEATE 5 MG/ML
1 SOLUTION/ DROPS OPHTHALMIC 2 TIMES DAILY
Qty: 1 BOTTLE | Refills: 0 | Status: SHIPPED | OUTPATIENT
Start: 2020-12-03 | End: 2021-01-02

## 2020-12-03 RX ORDER — LOSARTAN POTASSIUM 50 MG/1
50 TABLET ORAL DAILY
Qty: 30 TABLET | Refills: 3 | Status: SHIPPED | OUTPATIENT
Start: 2020-12-04

## 2020-12-03 RX ADMIN — DORZOLAMIDE HYDROCHLORIDE 1 DROP: 20 SOLUTION/ DROPS OPHTHALMIC at 20:13

## 2020-12-03 RX ADMIN — DORZOLAMIDE HYDROCHLORIDE 1 DROP: 20 SOLUTION/ DROPS OPHTHALMIC at 18:56

## 2020-12-03 RX ADMIN — LOSARTAN POTASSIUM 50 MG: 50 TABLET, FILM COATED ORAL at 10:07

## 2020-12-03 RX ADMIN — Medication 3 ML: at 10:09

## 2020-12-03 RX ADMIN — Medication 10 ML: at 10:10

## 2020-12-03 RX ADMIN — DORZOLAMIDE HYDROCHLORIDE 1 DROP: 20 SOLUTION/ DROPS OPHTHALMIC at 10:09

## 2020-12-03 RX ADMIN — Medication 10 ML: at 20:29

## 2020-12-03 RX ADMIN — MEMANTINE HYDROCHLORIDE 10 MG: 10 TABLET ORAL at 20:10

## 2020-12-03 RX ADMIN — MEMANTINE HYDROCHLORIDE 10 MG: 10 TABLET ORAL at 10:08

## 2020-12-03 RX ADMIN — TIMOLOL MALEATE 1 DROP: 5 SOLUTION/ DROPS OPHTHALMIC at 20:16

## 2020-12-03 RX ADMIN — ENOXAPARIN SODIUM 40 MG: 40 INJECTION SUBCUTANEOUS at 10:07

## 2020-12-03 RX ADMIN — ROSUVASTATIN CALCIUM 20 MG: 20 TABLET, FILM COATED ORAL at 20:10

## 2020-12-03 RX ADMIN — THIAMINE HYDROCHLORIDE 500 MG: 100 INJECTION, SOLUTION INTRAMUSCULAR; INTRAVENOUS at 20:46

## 2020-12-03 RX ADMIN — TIMOLOL MALEATE 1 DROP: 5 SOLUTION/ DROPS OPHTHALMIC at 10:09

## 2020-12-03 RX ADMIN — RISPERIDONE 0.25 MG: 0.5 TABLET ORAL at 20:10

## 2020-12-03 RX ADMIN — ASPIRIN 81 MG: 81 TABLET, COATED ORAL at 10:07

## 2020-12-03 RX ADMIN — HALOPERIDOL LACTATE 2 MG: 5 INJECTION, SOLUTION INTRAMUSCULAR at 06:51

## 2020-12-03 ASSESSMENT — ENCOUNTER SYMPTOMS
VOMITING: 0
BACK PAIN: 0
COLOR CHANGE: 0
NAUSEA: 0
SHORTNESS OF BREATH: 0
CHOKING: 0
PHOTOPHOBIA: 0
TROUBLE SWALLOWING: 0

## 2020-12-03 ASSESSMENT — PAIN SCALES - GENERAL: PAINLEVEL_OUTOF10: 0

## 2020-12-03 NOTE — FLOWSHEET NOTE
Medicated patient with Haldol 2MG IM to left gluteal as per doctors orders. Patient is aggressive, swinging, hitting making multiple attempts to get out of bed. [address]  aware. Avasys monitor in place.

## 2020-12-03 NOTE — PLAN OF CARE
Problem: Skin Integrity:  Goal: Will show no infection signs and symptoms  Description: Will show no infection signs and symptoms  Outcome: Ongoing  Goal: Absence of new skin breakdown  Description: Absence of new skin breakdown  Outcome: Ongoing     Problem: Falls - Risk of:  Goal: Will remain free from falls  Description: Will remain free from falls  Outcome: Ongoing  Goal: Absence of physical injury  Description: Absence of physical injury  Outcome: Ongoing     Problem: Mobility - Impaired:  Goal: Mobility will improve  Description: Mobility will improve  Outcome: Ongoing     Problem: Coping:  Goal: Ability to remain calm will improve  Description: Ability to remain calm will improve  Outcome: Ongoing     Problem: Safety:  Goal: Ability to remain free from injury will improve  Description: Ability to remain free from injury will improve  Outcome: Ongoing     Problem: Self-Care:  Goal: Ability to participate in self-care as condition permits will improve  Description: Ability to participate in self-care as condition permits will improve  Outcome: Ongoing

## 2020-12-03 NOTE — FLOWSHEET NOTE
Perfect-serve message sent to Dr HELMS [facility] [employer] to make aware per Elizabeth Vasques that this patient requires an EKG and a note from physician stating patient is medically stable for discharge. Awaiting further orders. ,

## 2020-12-03 NOTE — CONSULTS
[facility]. FOLLOW-UP NOTE       12/3/2020     Patient was seen and examined in person, Chart reviewed   Patient's case discussed with staff/team    Chief Complaint: agitation, paranoid    Interim History:     Pt continue to remain agitated especially in the evening and night  Severe cognitive decline  Patient needing PRN haldol last 2 evening  Paranoid and suspicious  Possibly internally stimulated  Alert and oriented x 1  Poor self care and hygiene  Pt has poor family support    Appetite:   [] Normal/Unchanged  [] Increased  [x] Decreased      Sleep:       [] Normal/Unchanged  [] Fair       [x] Poor              Energy:    [] Normal/Unchanged  [] Increased  [x] Decreased        SI [] Present  [] Absent    HI  []Present  [] Absent     Aggression:  [x] yes  [] no    Patient is [] able  [x] unable to CONTRACT FOR SAFETY     PAST MEDICAL/PSYCHIATRIC HISTORY:   Past Medical History:   Diagnosis Date    Hypertension        FAMILY/SOCIAL HISTORY:  History reviewed. No pertinent family history.   Social History     Socioeconomic History    Marital status: Single     Spouse name: Not on file    Number of children: Not on file    Years of education: Not on file    Highest education level: Not on file   Occupational History    Not on file   Social Needs    Financial resource strain: Not on file    Food insecurity     Worry: Not on file     Inability: Not on file    Transportation needs     Medical: Not on file     Non-medical: Not on file   Tobacco Use    Smoking status: Never Smoker    Smokeless tobacco: Never Used   Substance and Sexual Activity    Alcohol use: Not Currently     Frequency: Never    Drug use: Never    Sexual activity: Not on file   Lifestyle    Physical activity     Days per week: Not on file     Minutes per session: Not on file    Stress: Not on file   Relationships    Social connections     Talks on phone: Not on file     Gets together: Not on file Attends Nondenominational service: Not on file     Active member of club or organization: Not on file     Attends meetings of clubs or organizations: Not on file     Relationship status: Not on file    Intimate partner violence     Fear of current or ex partner: Not on file     Emotionally abused: Not on file     Physically abused: Not on file     Forced sexual activity: Not on file   Other Topics Concern    Not on file   Social History Narrative    Not on file           ROS:  [x] All negative/unchanged except if checked.  Explain positive(checked items) below:  [] Constitutional  [] Eyes  [] Ear/Nose/Mouth/Throat  [] Respiratory  [] CV  [] GI  []   [] Musculoskeletal  [] Skin/Breast  [] Neurological  [] Endocrine  [] Heme/Lymph  [] Allergic/Immunologic    Explanation:     MEDICATIONS:    Current Facility-Administered Medications:     memantine (NAMENDA) tablet 10 mg, 10 mg, Oral, BID, Catalino Loza MD, 10 mg at 12/03/20 1008    [COMPLETED] Saline lock IV, , , Continuous **AND** sodium chloride flush 0.9 % injection 3 mL, 3 mL, Intravenous, Q8H, Roland Casey, DO, 3 mL at 12/03/20 1009    sodium chloride flush 0.9 % injection 10 mL, 10 mL, Intravenous, 2 times per day, Marylen Perish LORNE Sedar, DO, 10 mL at 12/03/20 1010    sodium chloride flush 0.9 % injection 10 mL, 10 mL, Intravenous, PRN, Robby Cartagena Sedar, DO    acetaminophen (TYLENOL) tablet 650 mg, 650 mg, Oral, Q6H PRN **OR** acetaminophen (TYLENOL) suppository 650 mg, 650 mg, Rectal, Q6H PRN, Robby Mitzi Sedar, DO    polyethylene glycol (GLYCOLAX) packet 17 g, 17 g, Oral, Daily PRN, Robby Cartagena Sedar, DO    promethazine (PHENERGAN) tablet 12.5 mg, 12.5 mg, Oral, Q6H PRN **OR** ondansetron (ZOFRAN) injection 4 mg, 4 mg, Intravenous, Q6H PRN, Marylen Perish D Sedar, DO    enoxaparin (LOVENOX) injection 40 mg, 40 mg, Subcutaneous, Daily, Raymundo PRADHAN Sedar, DO, 40 mg at 12/03/20 1007    labetalol (NORMODYNE;TRANDATE) injection 10 mg, 10 mg, Intravenous, Q6H PRN, Robby Bolanos, DO    losartan results for input(s): BILITOT, ALKPHOS, AST, ALT in the last 72 hours. Lab Results   Component Value Date    LABAMPH Neg 11/30/2020    BARBSCNU Neg 11/30/2020    LABBENZ Neg 11/30/2020    LABMETH Neg 11/30/2020    OPIATESCREENURINE Neg 11/30/2020    PHENCYCLIDINESCREENURINE Neg 11/30/2020    ETOH <10 11/30/2020     Lab Results   Component Value Date    TSH 1.570 11/30/2020     No results found for: LITHIUM  No results found for: VALPROATE, CBMZ    RISK ASSESSMENT: high risk of agitation    Treatment Plan:  Reviewed current Medications with the patient. Continue PRN haldol  Risperdal Increase to 0.25 mg po bid  Recommend transfer to Geropsych unit - [facility] if medically stable  Pink slip to be completed by hospitalist for the transfer  Risks, benefits, side effects, drug-to-drug interactions and alternatives to treatment were discussed.           Electronically signed by Jori Mckenna MD on 12/3/2020 at 2:43 PM

## 2020-12-03 NOTE — FLOWSHEET NOTE
Perfect-serve message sent to Dr Graham Zaragoza to make aware of patients glucose levels of 58 on 12/02 and 63 on 12/01 with AM lab draws

## 2020-12-03 NOTE — PROGRESS NOTES
Normocephalic, atraumatic  Eyes: EOMI, PERRLA  ENT: moist mucous membranes  Neck: neck supple, trachea midline  Lungs: Good inspiratory effort, CTABL, no wheeze, no rhonchi, no rales  Heart: RRR, normal S1 and S2  GI: Soft, non-distended, non tender, no guarding, no rebound, +BS  MSK: no edema noted  Skin: warm, dry  Psych: appropriate affect     Data    CBC:   Lab Results   Component Value Date    WBC 6.6 12/02/2020    RBC 3.82 12/02/2020    HGB 11.7 12/02/2020    HCT 36.6 12/02/2020    MCV 95.8 12/02/2020    MCH 30.6 12/02/2020    MCHC 32.0 12/02/2020    RDW 14.3 12/02/2020     12/02/2020     CMP:    Lab Results   Component Value Date     12/02/2020    K 3.5 12/02/2020     12/02/2020    CO2 17 12/02/2020    BUN 27 12/02/2020    CREATININE 1.12 12/02/2020    GFRAA >60.0 12/02/2020    LABGLOM >60.0 12/02/2020    GLUCOSE 58 12/02/2020    PROT 8.5 11/30/2020    LABALBU 4.5 11/30/2020    CALCIUM 8.6 12/02/2020    BILITOT 0.5 11/30/2020    ALKPHOS 57 11/30/2020    AST 18 11/30/2020    ALT 7 11/30/2020       ASSESSMENT AND PLAN      # Encephalopathy - possibly related to undiagnosed dementia  - CT head negative. MRI negative  - pt oriented only to self  - neuro consulted- starting namenda  - continuing DAPT, thiamine  - PT/OT - will likely need placement  - Psych consulted for competency evaluation. Recommends geripsych admission to [facility]    # HTN  - cont home meds    DVT: lovenox    Disposition: Medically stable for admission to [facility] when bed available.  SW working with son for guardianship    Andrew Lopez, DO  Internal Medicine

## 2020-12-03 NOTE — PROGRESS NOTES
1935-Bedside rounding completed with Emani Ochoa RN. Pt is a+o to self only at this time. Pt noted to make frequent attempts to get out of bed on his own. Much nursing support and encouragement provided to pt by this RN. Pt medicated by previous RN with Haldol. Pt instructed to call for assistance should he have further needs or if he wishes to get out of bed during the shift. Bed alarm noted to be on for pt safety. AVASYS in pt room. Call bell and items are with in reach. Bed noted to be in lowest position. Will continue to monitor.      2000-PM medication held at this time as pt is sleeping soundly, will re-assess if pt awakens. Assessment completed, see Epic charting. Bed alarm noted to be on for pt safety. Call bell and items are with in reach. Bed noted to be in lowest position. Will continue to monitor.

## 2020-12-03 NOTE — PROGRESS NOTES
Physical Therapy Missed Treatment   Facility/Department: [facility] MED SURG [id number]    NAME: Drew Gutierrez  Patient Status:   : 1933 (80 y.o.)  MRN: [id number]  Account: [de-identified]  Gender: male        [x] Patient Declines PT Treatment            [] Patient Unavailable:     Pt states he is not feeling well today and wishes to rest. Pt has not eaten his lunch and did not want to get up to chair to try it. Will attempt PT Treatment again at earliest convenience.         Electronically signed by Jesi Phillips PTA on 12/3/20 at 2:45 PM EST

## 2020-12-03 NOTE — FLOWSHEET NOTE
Perfect-serve message sent to Dr Ramos to notify that chest-xray needs ordered per [facility] request, awaiting further orders.

## 2020-12-03 NOTE — PROGRESS NOTES
Occupational Therapy  Facility/Department: [facility] TELE  Daily Treatment Note  NAME: Aj Brower  : 1933  MRN: [id number]    Date of Service: 12/3/2020    Discharge Recommendations:  Continue to assess pending progress       Assessment      Activity Tolerance  Activity Tolerance: Patient limited by fatigue  Activity Tolerance: poor  Safety Devices  Safety Devices in place: Yes  Type of devices: All fall risk precautions in place         Patient Diagnosis(es): The primary encounter diagnosis was Disorientation. A diagnosis of Altered mental status, unspecified altered mental status type was also pertinent to this visit. has a past medical history of Hypertension. has no past surgical history on file. Restrictions  Restrictions/Precautions  Restrictions/Precautions: Fall Risk  Subjective   General  Chart Reviewed: Yes  Patient assessed for rehabilitation services?: Yes  Pain Assessment  Pain Assessment: 0-10  Pain Level: 0  Patient's Stated Pain Goal: No pain  Pre Treatment Pain Screening  Pain at present: 0  Scale Used: Numeric Score  Intervention List: Patient able to continue with treatment  Vital Signs  Patient Currently in Pain: Denies   Orientation     Objective    ADL  UE Bathing: Stand by assistance  LE Bathing: Minimal assistance  UE Dressing: Minimal assistance  LE Dressing: None  Toileting: Minimal assistance(For use of urinal at EOB.)  Additional Comments: Patient demonstrating confused behaviors. Visual perceptual skills off this date. This affected  Nursing is aware of patient behaviors.         Balance  Sitting Balance: Supervision  Bed mobility  Supine to Sit: Minimal assistance  Sit to Supine: Stand by assistance  Scooting: Stand by assistance           Plan   Plan  Times per week: 1-4x/wk  Current Treatment Recommendations: Strengthening, Functional Mobility Training, Endurance Training, Balance Training, Neuromuscular Re-education, Self-Care / ADL, Safety Education & Training, Cognitive/Perceptual Training, Cognitive Reorientation  Plan Comment: Continue current POC    Goals  Patient Goals   Patient goals :  To return to [facility]       Therapy Time   Individual Concurrent Group Co-treatment   Time In 1010         Time Out 1040         Minutes 30              ADL trainin minutes    [address]    Electronically signed by [id number] on 12/3/2020 at 11:20 AM

## 2020-12-04 VITALS
SYSTOLIC BLOOD PRESSURE: 160 MMHG | HEIGHT: 69 IN | BODY MASS INDEX: 23.7 KG/M2 | RESPIRATION RATE: 18 BRPM | WEIGHT: 160 LBS | TEMPERATURE: 97.5 F | HEART RATE: 66 BPM | OXYGEN SATURATION: 97 % | DIASTOLIC BLOOD PRESSURE: 83 MMHG

## 2020-12-04 LAB
ANION GAP SERPL CALCULATED.3IONS-SCNC: 11 MEQ/L (ref 9–15)
BASOPHILS ABSOLUTE: 0 K/UL (ref 0–0.2)
BASOPHILS RELATIVE PERCENT: 0.6 %
BUN BLDV-MCNC: 21 MG/DL (ref 8–23)
CALCIUM SERPL-MCNC: 8.3 MG/DL (ref 8.5–9.9)
CHLORIDE BLD-SCNC: 111 MEQ/L (ref 95–107)
CO2: 22 MEQ/L (ref 20–31)
CREAT SERPL-MCNC: 0.93 MG/DL (ref 0.7–1.2)
EOSINOPHILS ABSOLUTE: 0.2 K/UL (ref 0–0.7)
EOSINOPHILS RELATIVE PERCENT: 2.5 %
GFR AFRICAN AMERICAN: >60
GFR NON-AFRICAN AMERICAN: >60
GLUCOSE BLD-MCNC: 83 MG/DL (ref 70–99)
HCT VFR BLD CALC: 33.7 % (ref 42–52)
HEMOGLOBIN: 10.9 G/DL (ref 14–18)
LYMPHOCYTES ABSOLUTE: 1.5 K/UL (ref 1–4.8)
LYMPHOCYTES RELATIVE PERCENT: 23 %
MCH RBC QN AUTO: 30.4 PG (ref 27–31.3)
MCHC RBC AUTO-ENTMCNC: 32.3 % (ref 33–37)
MCV RBC AUTO: 94.1 FL (ref 80–100)
MONOCYTES ABSOLUTE: 0.8 K/UL (ref 0.2–0.8)
MONOCYTES RELATIVE PERCENT: 11.9 %
NEUTROPHILS ABSOLUTE: 4 K/UL (ref 1.4–6.5)
NEUTROPHILS RELATIVE PERCENT: 62 %
PDW BLD-RTO: 13.7 % (ref 11.5–14.5)
PLATELET # BLD: 241 K/UL (ref 130–400)
POTASSIUM SERPL-SCNC: 3.5 MEQ/L (ref 3.4–4.9)
RBC # BLD: 3.58 M/UL (ref 4.7–6.1)
SARS-COV-2, NAAT: NOT DETECTED
SODIUM BLD-SCNC: 144 MEQ/L (ref 135–144)
WBC # BLD: 6.4 K/UL (ref 4.8–10.8)

## 2020-12-04 PROCEDURE — 2580000003 HC RX 258: Performed by: EMERGENCY MEDICINE

## 2020-12-04 PROCEDURE — APPSS15 APP SPLIT SHARED TIME 0-15 MINUTES: Performed by: NURSE PRACTITIONER

## 2020-12-04 PROCEDURE — 36415 COLL VENOUS BLD VENIPUNCTURE: CPT

## 2020-12-04 PROCEDURE — 99233 SBSQ HOSP IP/OBS HIGH 50: CPT | Performed by: PSYCHIATRY & NEUROLOGY

## 2020-12-04 PROCEDURE — U0002 COVID-19 LAB TEST NON-CDC: HCPCS

## 2020-12-04 PROCEDURE — 6370000000 HC RX 637 (ALT 250 FOR IP): Performed by: INTERNAL MEDICINE

## 2020-12-04 PROCEDURE — 93010 ELECTROCARDIOGRAM REPORT: CPT | Performed by: INTERNAL MEDICINE

## 2020-12-04 PROCEDURE — 80048 BASIC METABOLIC PNL TOTAL CA: CPT

## 2020-12-04 PROCEDURE — 97129 THER IVNTJ 1ST 15 MIN: CPT

## 2020-12-04 PROCEDURE — 6360000002 HC RX W HCPCS: Performed by: INTERNAL MEDICINE

## 2020-12-04 PROCEDURE — 6370000000 HC RX 637 (ALT 250 FOR IP): Performed by: PSYCHIATRY & NEUROLOGY

## 2020-12-04 PROCEDURE — 2580000003 HC RX 258: Performed by: INTERNAL MEDICINE

## 2020-12-04 PROCEDURE — 85025 COMPLETE CBC W/AUTO DIFF WBC: CPT

## 2020-12-04 RX ADMIN — Medication 10 ML: at 10:13

## 2020-12-04 RX ADMIN — MEMANTINE HYDROCHLORIDE 10 MG: 10 TABLET ORAL at 10:11

## 2020-12-04 RX ADMIN — DORZOLAMIDE HYDROCHLORIDE 1 DROP: 20 SOLUTION/ DROPS OPHTHALMIC at 10:22

## 2020-12-04 RX ADMIN — TIMOLOL MALEATE 1 DROP: 5 SOLUTION/ DROPS OPHTHALMIC at 10:22

## 2020-12-04 RX ADMIN — LOSARTAN POTASSIUM 50 MG: 50 TABLET, FILM COATED ORAL at 10:11

## 2020-12-04 RX ADMIN — Medication 3 ML: at 10:14

## 2020-12-04 RX ADMIN — ASPIRIN 81 MG: 81 TABLET, COATED ORAL at 10:11

## 2020-12-04 RX ADMIN — THIAMINE HYDROCHLORIDE 500 MG: 100 INJECTION, SOLUTION INTRAMUSCULAR; INTRAVENOUS at 11:39

## 2020-12-04 RX ADMIN — ENOXAPARIN SODIUM 40 MG: 40 INJECTION SUBCUTANEOUS at 10:11

## 2020-12-04 ASSESSMENT — PAIN SCALES - GENERAL: PAINLEVEL_OUTOF10: 0

## 2020-12-04 ASSESSMENT — ENCOUNTER SYMPTOMS
VOMITING: 0
BACK PAIN: 0
TROUBLE SWALLOWING: 0
PHOTOPHOBIA: 0
COLOR CHANGE: 0
NAUSEA: 0
SHORTNESS OF BREATH: 0
CHOKING: 0

## 2020-12-04 NOTE — CARE COORDINATION
LSW spoke with admissions and faxed them requested documentation. Still awaiting negative COVID but they have everything else needed to consider admission. Awaiting there review. All info was received by Destiny at [facility] and pt will transfer to them at 3:30 today.

## 2020-12-04 NOTE — PROGRESS NOTES
Neurology Follow up    SUBJECTIVE: ROS - NO Headache, double vision,blurry vision,difficulty with speech,difficulty with swallowing,weakness,numbness,pain,nausea,vomitting,chocking,neck pain,dizziness,  Patient denies any back pain neck pain    Patient appears to be sleeping does awaken quite pleasant though disoriented. He had some issues at night regarding agitation and he sundown's. He still has no recall is in the hospital but is [place]. We will still not able to find his son or friends for further details      Patient seen and examined for neurology follow-up for encephalopathy with underlying dementia and behavioral disturbances. Currently alert and oriented x1, no acute distress, cooperative. Patient did have significant behavioral disturbances overnight. No focal neuro deficits. Pt seen and examned,  Ate well,  No agitaion    Patient currently alert and oriented x1. Had behavioral issues again overnight requiring Haldol. Taking p.o. Patient with generalized weakness and difficulty with ambulation due to deconditioning. According to nursing psych has evaluated patient. Still awaiting recommendations. Patient currently alert and oriented x1, no acute distress, cooperative. Behaviors improved overnight. Psych saw patient yesterday and increased Risperdal.  No new focal deficits. Patient slept much better last night though he appeared to be somewhat groggy in the morning is also told by the nurse.   Current Facility-Administered Medications   Medication Dose Route Frequency Provider Last Rate Last Dose    memantine (NAMENDA) tablet 10 mg  10 mg Oral BID Nick Reynoso MD   10 mg at 12/03/20 2010    sodium chloride flush 0.9 % injection 3 mL  3 mL Intravenous Q8H Bradford Good, DO   3 mL at 12/03/20 1009    sodium chloride flush 0.9 % injection 10 mL  10 mL Intravenous 2 times per day Erika Bolanos, DO   10 mL at 12/03/20 2029    sodium chloride flush 0.9 % injection 10 mL  10 mL Intravenous PRN Shannen Rued Sedar, DO        acetaminophen (TYLENOL) tablet 650 mg  650 mg Oral Q6H PRN Shannen Rued Sedar, DO        Or    acetaminophen (TYLENOL) suppository 650 mg  650 mg Rectal Q6H PRN Shannen Rued Sedar, DO        polyethylene glycol (GLYCOLAX) packet 17 g  17 g Oral Daily PRN Shannen Rued Sedar, DO        promethazine (PHENERGAN) tablet 12.5 mg  12.5 mg Oral Q6H PRN Shannen Rued Sedar, DO        Or    ondansetron (ZOFRAN) injection 4 mg  4 mg Intravenous Q6H PRN Shannen Rued Sedar, DO        enoxaparin (LOVENOX) injection 40 mg  40 mg Subcutaneous Daily Delaney PRADHAN Sedar, DO   40 mg at 12/03/20 1007    labetalol (NORMODYNE;TRANDATE) injection 10 mg  10 mg Intravenous Q6H PRN Shannen Rued Sedar, DO        losartan (COZAAR) tablet 50 mg  50 mg Oral Daily Raymundo PRADHAN Sedar, DO   50 mg at 12/03/20 1007    aspirin EC tablet 81 mg  81 mg Oral Daily Raymundo PRADHAN Sedar, DO   81 mg at 12/03/20 1007    rosuvastatin (CRESTOR) tablet 20 mg  20 mg Oral Nightly Shannen Rued Sedar, DO   20 mg at 12/03/20 2010    thiamine (B-1) 500 mg in sodium chloride 0.9 % 100 mL IVPB  500 mg Intravenous Daily Shannen Rued Sedar, DO   Stopped at 12/03/20 2116    dorzolamide (TRUSOPT) 2 % ophthalmic solution 1 drop  1 drop Both Eyes TID Shannen Rita Sedar, DO   1 drop at 12/03/20 2013    timolol (TIMOPTIC) 0.5 % ophthalmic solution 1 drop  1 drop Both Eyes BID Shannen Rita Sedar, DO   1 drop at 12/03/20 2016    risperiDONE (RISPERDAL) tablet 0.25 mg  0.25 mg Oral Nightly Terernce Plummer MD   0.25 mg at 12/03/20 2010    haloperidol lactate (HALDOL) injection 2 mg  2 mg Intramuscular Q6H PRN Carolynnmiguel Abbott, DO   2 mg at 12/03/20 4612        PHYSICAL EXAM:    BP (!) 160/83   Pulse 66   Temp 97.5 °F (36.4 °C) (Oral)   Resp 18   Ht 5' 9\" (1.753 m)   Wt 160 lb (72.6 kg)   SpO2 97%   BMI 23.63 kg/m²    General Appearance:      Skin:  normal  CVS - Normal sounds, No murmurs , No carotid Bruits  RS -CTA  Abdomen Soft, BS present    Review of Systems   Unable to perform ROS: Mental status change   Constitutional: Negative for fever. HENT: Negative for ear pain, tinnitus and trouble swallowing. Eyes: Negative for photophobia and visual disturbance. Respiratory: Negative for choking and shortness of breath. Cardiovascular: Negative for chest pain and palpitations. Gastrointestinal: Negative for nausea and vomiting. Musculoskeletal: Negative for back pain, gait problem, joint swelling, myalgias, neck pain and neck stiffness. Skin: Negative for color change. Neurological: Negative for dizziness, tremors, seizures, syncope, facial asymmetry, speech difficulty, weakness, light-headedness, numbness and headaches. Psychiatric/Behavioral: Positive for confusion. Negative for behavioral problems, hallucinations and sleep disturbance.     pleasant but confused//patient is very awake at this time but not agitated  Mental Status Exam:             Level of Alertness:   awake            Orientation:   person,           Attention/Concentration:  normal            Language:  normal      Funduscopic Exam:     Cranial Nerves            Cranial nerve III           Pupils:  equal, round, reactive to light      Cranial nerves III, IV, VI           Extraocular Movements: intact      Cranial nerve V           Facial sensation:  intact      Cranial nerve VII           Facial strength: intact      Cranial nerve VIII           Hearing:  intact      Cranial nerve IX           Palate:  intact      Cranial nerve XI         Shoulder shrug:  intact      Cranial nerve XII          Tongue movement:  normal    Motor:    Drift:  absent  Motor exam is symmetrical 5 out of 5 all extremities bilaterally  Tone:  normal  Abnormal Movements:  absent            Sensory:        Pinprick             Right Upper Extremity:  normal             Left Upper Extremity:  normal             Right Lower Extremity:  normal             Left Lower Extremity:  normal           Vibration Touch            Proprioception                 Coordination:           Finger/Nose   Right:  normal              Left:  normal          Heel-Knee-Shin                Right:  normal              Left:  normal          Rapid Alternating Movements              Right:  normal              Left:  normal          Gait:                       Casual: Frontal gait disorder is notable                       Romberg:  normal            Reflexes:             Deep Tendon Reflexes:             Reflexes are absent ankles               Plantar response:                Right:  downgoing               Left:  downgoing    Vascular:  Cardiac Exam:  normal         Ct Head Wo Contrast    Result Date: 11/30/2020  CT Brain Contrast medium:  Not utilized. History: Altered mental status Comparison:  July 5, 2019 Findings: Extra-axial spaces:  Normal. Intracranial hemorrhage:  None. Ventricular system: Ventricles mildly enlarged. Sulci mildly prominent. Basal Cisterns:  Normal. Cerebral Parenchyma: Bilateral symmetric periventricular areas decreased attenuation. Midline Shift:  None. Cerebellum:  Normal.  Paranasal sinuses and mastoid air cells: Mucosal thickening bilateral maxillary sinuses, right greater than left. Visualized Orbits:  Normal.     Impression: Bilateral maxillary sinusitis. All CT scans at this facility use dose modulation, iterative reconstruction, and/or weight based dosing when appropriate to reduce radiation dose to as low as reasonably achievable. Xr Chest Portable    Result Date: 11/30/2020  EXAMINATION: XR CHEST PORTABLE CLINICAL HISTORY: ALTERED MENTAL STATUS COMPARISONS: None available. FINDINGS: Osseous structures intact. Cardiopericardial silhouette normal. Pulmonary vasculature normal. Ill-defined areas of increased opacity are found in the left lower lung, right lung is clear. LEFT LOWER LUNG SUBSEGMENTAL ATELECTASIS/PNEUMONIA.     Mri Brain Wo Contrast    Result Date: 11/30/2020  MRI BRAIN WO CONTRAST : 11/30/2020 CLINICAL HISTORY:  amnesia, eval for CVA. COMPARISON: Head CT from earlier 11/30/2020. TECHNIQUE: Multiplanar MR imaging of the head was performed without contrast. FINDINGS: The study is limited by the patient's medical condition and motion. There is no infarct, intracranial hemorrhage, mass effect, midline shift, extra-axial collection, or hydrocephalus. Mild to moderate generalized cerebral volume loss is present, with moderate predominantly supratentorial white matter changes, most consistent with chronic small vessel ischemic disease. NO ACUTE INTRACRANIAL PROCESS IDENTIFIED, WITHIN LIMITS OF THE STUDY. ATROPHIC AND INVOLUTIONAL CHANGES. Recent Labs     12/02/20  0555 12/04/20  0603   WBC 6.6 6.4   HGB 11.7* 10.9*    241     Recent Labs     12/02/20  0555 12/04/20  0603    144   K 3.5 3.5   * 111*   CO2 17* 22   BUN 27* 21   CREATININE 1.12 0.93   GLUCOSE 58* 83     No results for input(s): BILITOT, ALKPHOS, AST, ALT in the last 72 hours. Lab Results   Component Value Date    PROTIME 11.1 07/05/2019    INR 1.1 07/05/2019     No results found for: LITHIUM, DILFRTOT, VALPROATE    ASSESSMENT AND PLAN  Alzheimer's dementia with a slow decline. Witness account is still not available we tried to contact his son who is in [place] we were not able to reach him and we tried to find some friends who can give us more insight but no one can give us any insight to his progressive memory issues and how he functions. Safety issues other for concern and will relate this to social workers regarding placement. MRI of the brain was reviewed personally there appears to be some degree of atrophy with minor small vessel ischemic changes may explain his memory as well as gait issues. We will start him on Namenda now as he has had some sundowning and agitation this may help.   We will continue to keep him under observation and treat his behaviors and sundowning which again fall into a neurological disorder. Pt seen and he slept well  He is not concerned about being here and not sure why he is here      Patient was seen and he appeared to be groggy in the morning but is awoken as the day goes by and appears to be doing better since patient slept well yesterday with Risperdal on board. Again we had significant difficulty finding his son which I tried again and I cannot find any of his friends and some of this is limited due to Covid and they cannot come and see him. I am not quite sure who they are as we have not heard from them. Patient will require skilled nursing. She will be continued on Risperdal and Namenda and this is our neurological drugs  Awaiting psych evaluation for competency  Plan to DC to skilled nursing facility  PT/OT eval  EEG completed  TSH, B12 and folate within normal limits  Spoke with care coordinator and nursing. It appears patient is somewhat estranged from his family and baseline mental status is hard to determine at this time. Still awaiting psych evaluation for competency and further recommendations  Discharge planning in progress    Psych eval complete  Patient to be pink slipped and transferred to [address] [place] [address] psych unit  Risperdal increased  Okay to DC from neurology standpoint  I have personally performed a face to face diagnostic evaluation on this patient, reviewed all data and investigations, and am the sole provider of all clinical decisions on the neurological status of this patient. See my evaluation as above. This done in isolation to the nurses notes. Ian Walker MD, [address], American Board of Psychiatry & Neurology  Board Certified in Vascular Neurology  Board Certified in Neuromuscular Medicine  Certified in . [address]

## 2020-12-04 NOTE — PROGRESS NOTES
[facility]   Facility/Department: [facility]  Speech Language Pathology  Treatment Note  Erick York  6/23/1933  [id number]/[id number]    Medical Dx: AMS (altered mental status) [R41.82]  Encephalopathy acute [G93.40]  Speech Dx: Cognitive Linguistic Impairment        12/4/2020      Subjective:  Alert, Cooperative and Pleasant        Interventions used this date:  Cognitive Skill Development    Objective/Assessment:  Patient progressing towards goals:  Short-term Goals  Timeframe for Short-term Goals: 2 weeks  Goal 1: To increase safety awareness and judgment for safe completion of ADLs secondary to pt's cognitive deficits,  pt will complete basic level problem solving tasks related to ADLs (e.g. finances and medication) with 70% accuracy and min cues. Pt answered mid level problems related to home safety I with 20% accuracy, increased to 40% accuracy with mod cueing. Goal 2: To increase safety awareness and judgment for safe completion of ADLs secondary to pt's cognitive deficits, pt will provide reasonable solutions to problems of everyday living with 80% accuracy and min cues. Goal 3: To increase safety awareness and judgment for safe completion of ADLs secondary to pt's cognitive deficits, pt will complete abstract reasoning tasks (i.e. Word deduction, convergent and divergent naming, similarities/differences) with 80% accuracy and min cues. Pt completed concrete divergent naming task I with 42% accuracy. Pt completed concrete convergent naming task I with 83% accuracy. Goal 4: To address pt's cognitive deficits and promote orientation, pt will state name of facility, time within 1 hour, reason in hospital, current month and year with 100% accuracy with min assist, with use of external aid. Pt answered time and place orientation questions I with 0% accuracy. ST oriented the pt to place, month, date, and year. Goal 5:  To address pt's cognitive deficits and promote his/her ability to safely follow directives in a variety of environments, pt will carry out verbal directives of increasing complexity in everyday activities with 80% accuracy supervision cues. Goal 6: To decrease pt's cognitive deficits through the use of compensatory strategies, the pt will be educated on 3 different memory strategies and verbalize how he/she might use them at home in 3 ways with min cues. Long-term Goals  Timeframe for Long-term Goals: 2 weeks  Goal 1: Pt will improve his Cognition from severe assist to min to  mod for adequate functional recall and safety awareness for home. Short-term Goals  Timeframe for Short-term Goals: 1 week  Goal 1: Pt will tolerate regular solids with thin liquids without overt s/s of aspiration with 100% accuracy  Compensatory Swallowing Strategies: Small bites/sips, Alternate solids and liquids, Upright as possible for all oral intake    Pt is confused. Pt was twisting his gown in his fingers during the entire session. 24/7 supervision is recommended upon discharge      Treatment/Activity Tolerance:  Patient tolerated treatment well    Plan:  Continue per POC    Pain Assessment:  Initial Assessment:  Patient denies pain. Re-assessment:  Patient denies pain. Patient/Caregiver Education:  Patient educated on session and progression towards goals.     Safety Devices:  Call light within reach, Chair alarm in place and Avasys    Therapy Time  SLP Individual Minutes  Time In: 1329  Time Out: 2449  Minutes: 15            Signature: Electronically signed by BRUNO Stokes on 12/4/2020 at 1:46 PM

## 2020-12-04 NOTE — FLOWSHEET NOTE
Patient denies cp, sob, n/v. Patient alert to self, disoriented to place, time, and situation. LS clear and diminished. BS active in all 4 quadrants. Patient compliant with taking evening medications. Patient resting in bed at this time. Avasys in place, bed alarm on. Call light within reach. Will continue to monitor.

## 2020-12-04 NOTE — DISCHARGE SUMMARY
Physician Discharge Summary     Patient ID:  Erick York  [id number]  80 y.o.  6/23/1933    Admit date: 11/30/2020    Discharge date : 12/04/20     Admitting Physician: Lu Weir DO     Discharge Physician: Althea Sewell DO     Admission Diagnoses: AMS (altered mental status) [R41.82]  Encephalopathy acute [G93.40]    Discharge Diagnoses: Newly diagnosed dementia with behavioral disturbances    Admission Condition: fair    Discharged Condition: fair    Hospital Course: 79 y/o male who presented with AMS after being found confused in the grocery store and brought to the ED. Pt with no family, very confused, only oriented to self and had not seen a doctor so no recent medical records. Pt lives in a camper by himself. ED workup unremarkable, but pt not safe to discharge home and was admitted for further evaluation. CT head and MRI were negative. Pt became combative and agitated on multiple occasions. Neuro consulted and reviewed his findings and believed that patient likely had Alzheimers dementia which had been previously undiagnosed and started patient on namenda. SW able to get in touch with the son who was willing to take on guardianship. Pt lacked capacity to make his own decisions and high risk to harming himself and others. Psych consulted and deemed him not competent to make his decisions and recommended arnoldo-psych admission to [facility] which was approved on 12/4. Pt was pink slipped and discharged to [facility] on 12/4 in stable condition. They will continue to follow up with son concerning guardianship and POA. Consults: neurology      Discharge Exam:  Constitutional: Oriented only to self.  Sitting in chair comfortably  Head: Normocephalic, atraumatic  Eyes: EOMI, PERRLA  ENT: moist mucous membranes  Neck: neck supple, trachea midline  Lungs: Good inspiratory effort, CTABL, no wheeze, no rhonchi, no rales  Heart: RRR, normal S1 and S2  GI: Soft, non-distended, non tender, no guarding, no rebound, +BS  MSK: no edema noted  Skin: warm, dry  Psych: appropriate affect    Labs:   Recent Labs     12/02/20  0555 12/04/20  0603   WBC 6.6 6.4   HGB 11.7* 10.9*   HCT 36.6* 33.7*    241     Recent Labs     12/02/20  0555 12/04/20  0603    144   K 3.5 3.5   * 111*   CO2 17* 22   BUN 27* 21   CREATININE 1.12 0.93   CALCIUM 8.6 8.3*     No results for input(s): AST, ALT, BILIDIR, BILITOT, ALKPHOS in the last 72 hours. No results for input(s): INR in the last 72 hours. No results for input(s): Reyne Serbian in the last 72 hours. Urinalysis:   Lab Results   Component Value Date    NITRU Negative 11/30/2020    WBCUA 0-2 11/30/2020    BACTERIA Negative 11/30/2020    RBCUA 6-10 11/30/2020    BLOODU TRACE 11/30/2020    SPECGRAV 1.024 11/30/2020    GLUCOSEU Negative 11/30/2020       Radiology:   Most recent    Chest CT      WITH CONTRAST:No results found for this or any previous visit. WITHOUT CONTRAST: No results found for this or any previous visit. CXR      2-view: No results found for this or any previous visit. Portable:   Results for orders placed during the hospital encounter of 11/30/20   XR CHEST PORTABLE    Narrative Exam: XR CHEST PORTABLE    History: Altered mental status. Shortness of breath. Technique: AP portable view of the chest obtained. Comparison: Portable chest radiograph from November 30, 2020    Findings:    Atherosclerotic calcification of the thoracic aorta. The cardiomediastinal silhouette is within normal limits. Interval improvement but minimal persistence of left lung base opacities. No pneumothorax or pleural effusion. Bones of the thorax appear   intact. Impression Interval improvement but minimal persistence of left lung base infiltrate and/or atelectasis. Echo No results found for this or any previous visit.     Disposition: [facility]    In process/preliminary results:  Outstanding Order Results     No orders found from

## 2020-12-06 PROCEDURE — 95816 EEG AWAKE AND DROWSY: CPT | Performed by: PSYCHIATRY & NEUROLOGY

## 2020-12-09 NOTE — PROGRESS NOTES
Physical Therapy  Facility/Department: [facility] [id number]  Physical Therapy Discharge      NAME: Artur Layton    : 1933 (80 y.o.)  MRN: [id number]    Account: [de-identified]  Gender: male      Patient has been discharged from acute care hospital. DC patient from current PT program.      Electronically signed by Valarie Mcdowell PT on 20 at 2:25 PM EST

## 2021-08-16 NOTE — PROGRESS NOTES
Ernst called for a medication refill of her focalin. She would like to speak with the nurse regards to some of side effects that she Is having. [phone]   Neurology Follow up    SUBJECTIVE: ROS - NO Headache, double vision,blurry vision,difficulty with speech,difficulty with swallowing,weakness,numbness,pain,nausea,vomitting,chocking,neck pain,dizziness,  Patient denies any back pain neck pain    Patient appears to be sleeping does awaken quite pleasant though disoriented. He had some issues at night regarding agitation and he sundown's. He still has no recall is in the hospital but is [place]. We will still not able to find his son or friends for further details      Patient seen and examined for neurology follow-up for encephalopathy with underlying dementia and behavioral disturbances. Currently alert and oriented x1, no acute distress, cooperative. Patient did have significant behavioral disturbances overnight. No focal neuro deficits. Pt seen and examned,  Ate well,  No agitaion    Patient currently alert and oriented x1. Had behavioral issues again overnight requiring Haldol. Taking p.o. Patient with generalized weakness and difficulty with ambulation due to deconditioning. According to nursing psych has evaluated patient. Still awaiting recommendations.     Pt actually has had less agitation  Current Facility-Administered Medications   Medication Dose Route Frequency Provider Last Rate Last Dose    memantine (NAMENDA) tablet 10 mg  10 mg Oral BID Brandi Gagnon MD   10 mg at 12/03/20 1008    sodium chloride flush 0.9 % injection 3 mL  3 mL Intravenous Q8H Danielle Dolan, DO   3 mL at 12/03/20 1009    sodium chloride flush 0.9 % injection 10 mL  10 mL Intravenous 2 times per day Mino PRADHAN Sedar, DO   10 mL at 12/03/20 1010    sodium chloride flush 0.9 % injection 10 mL  10 mL Intravenous PRN Denia Spry Sedar, DO        acetaminophen (TYLENOL) tablet 650 mg  650 mg Oral Q6H PRN Denia Spry Sedar, DO        Or    acetaminophen (TYLENOL) suppository 650 mg  650 mg Rectal Q6H PRN Denia Spry Sedar, DO        polyethylene glycol (GLYCOLAX) packet 17 g  17 g Oral Daily PRN Rich Lamer Sedar, DO        promethazine (PHENERGAN) tablet 12.5 mg  12.5 mg Oral Q6H PRN Rich Lamer Sedar, DO        Or    ondansetron [facility]) injection 4 mg  4 mg Intravenous Q6H PRN Rich Lamer Sedar, DO        enoxaparin (LOVENOX) injection 40 mg  40 mg Subcutaneous Daily Thamas Alan D Sedar, DO   40 mg at 12/03/20 1007    labetalol (NORMODYNE;TRANDATE) injection 10 mg  10 mg Intravenous Q6H PRN Rich Lamer Sedar, DO        losartan (COZAAR) tablet 50 mg  50 mg Oral Daily Raymundo D Sedar, DO   50 mg at 12/03/20 1007    aspirin EC tablet 81 mg  81 mg Oral Daily Raymundo D Sedar, DO   81 mg at 12/03/20 1007    rosuvastatin (CRESTOR) tablet 20 mg  20 mg Oral Nightly Rich Lamer Sedar, DO   20 mg at 12/01/20 2135    thiamine (B-1) 500 mg in sodium chloride 0.9 % 100 mL IVPB  500 mg Intravenous Daily Rich Lamer Sedar, DO   Stopped at 12/02/20 1901    dorzolamide (TRUSOPT) 2 % ophthalmic solution 1 drop  1 drop Both Eyes TID Rich Lamer Sedar, DO   1 drop at 12/03/20 1009    timolol (TIMOPTIC) 0.5 % ophthalmic solution 1 drop  1 drop Both Eyes BID Rich Lamer Sedar, DO   1 drop at 12/03/20 1009    risperiDONE (RISPERDAL) tablet 0.25 mg  0.25 mg Oral Nightly Herbie Navarrete MD   0.25 mg at 12/01/20 2134    haloperidol lactate (HALDOL) injection 2 mg  2 mg Intramuscular Q6H PRN Fela Oleksandr, DO   2 mg at 12/03/20 5269        PHYSICAL EXAM:    BP (!) 165/69   Pulse 76   Temp 97 °F (36.1 °C) (Axillary)   Resp 16   Ht 5' 9\" (1.753 m)   Wt 160 lb (72.6 kg)   SpO2 98%   BMI 23.63 kg/m²    General Appearance:      Skin:  normal  CVS - Normal sounds, No murmurs , No carotid Bruits  RS -CTA  Abdomen Soft, BS present    Review of Systems   Unable to perform ROS: Mental status change   Constitutional: Negative for fever. HENT: Negative for ear pain, tinnitus and trouble swallowing. Eyes: Negative for photophobia and visual disturbance. Respiratory: Negative for choking and shortness of breath. Cardiovascular: Negative for chest pain and palpitations. Gastrointestinal: Negative for nausea and vomiting. Musculoskeletal: Negative for back pain, gait problem, joint swelling, myalgias, neck pain and neck stiffness. Skin: Negative for color change. Neurological: Negative for dizziness, tremors, seizures, syncope, facial asymmetry, speech difficulty, weakness, light-headedness, numbness and headaches. Psychiatric/Behavioral: Positive for confusion. Negative for behavioral problems, hallucinations and sleep disturbance.     pleasant but confused  Pt sitting and eating on own  Mental Status Exam:             Level of Alertness:   awake            Orientation:   person,           Attention/Concentration:  normal            Language:  normal  Very pleasandtand confused    Funduscopic Exam:     Cranial Nerves            Cranial nerve III           Pupils:  equal, round, reactive to light      Cranial nerves III, IV, VI           Extraocular Movements: intact      Cranial nerve V           Facial sensation:  intact      Cranial nerve VII           Facial strength: intact      Cranial nerve VIII           Hearing:  intact      Cranial nerve IX           Palate:  intact      Cranial nerve XI         Shoulder shrug:  intact      Cranial nerve XII          Tongue movement:  normal    Motor:    Drift:  absent  Motor exam is symmetrical 5 out of 5 all extremities bilaterally  Tone:  normal  Abnormal Movements:  absent            Sensory:        Pinprick             Right Upper Extremity:  normal             Left Upper Extremity:  normal             Right Lower Extremity:  normal             Left Lower Extremity:  normal           Vibration                         Touch            Proprioception                 Coordination:           Finger/Nose   Right:  normal              Left:  normal          Heel-Knee-Shin                Right:  normal              Left:  normal          Rapid Alternating Movements Right:  normal              Left:  normal          Gait:                       Casual: Frontal gait disorder is notable                       Romberg:  normal            Reflexes:             Deep Tendon Reflexes:             Reflexes are absent ankles               Plantar response:                Right:  downgoing               Left:  downgoing    Vascular:  Cardiac Exam:  normal         Ct Head Wo Contrast    Result Date: 11/30/2020  CT Brain Contrast medium:  Not utilized. History: Altered mental status Comparison:  July 5, 2019 Findings: Extra-axial spaces:  Normal. Intracranial hemorrhage:  None. Ventricular system: Ventricles mildly enlarged. Sulci mildly prominent. Basal Cisterns:  Normal. Cerebral Parenchyma: Bilateral symmetric periventricular areas decreased attenuation. Midline Shift:  None. Cerebellum:  Normal.  Paranasal sinuses and mastoid air cells: Mucosal thickening bilateral maxillary sinuses, right greater than left. Visualized Orbits:  Normal.     Impression: Bilateral maxillary sinusitis. All CT scans at this facility use dose modulation, iterative reconstruction, and/or weight based dosing when appropriate to reduce radiation dose to as low as reasonably achievable. Xr Chest Portable    Result Date: 11/30/2020  EXAMINATION: XR CHEST PORTABLE CLINICAL HISTORY: ALTERED MENTAL STATUS COMPARISONS: None available. FINDINGS: Osseous structures intact. Cardiopericardial silhouette normal. Pulmonary vasculature normal. Ill-defined areas of increased opacity are found in the left lower lung, right lung is clear. LEFT LOWER LUNG SUBSEGMENTAL ATELECTASIS/PNEUMONIA. Mri Brain Wo Contrast    Result Date: 11/30/2020  MRI BRAIN WO CONTRAST : 11/30/2020 CLINICAL HISTORY:  amnesia, eval for CVA. COMPARISON: Head CT from earlier 11/30/2020. TECHNIQUE: Multiplanar MR imaging of the head was performed without contrast. FINDINGS: The study is limited by the patient's medical condition and motion. There is no infarct, intracranial hemorrhage, mass effect, midline shift, extra-axial collection, or hydrocephalus. Mild to moderate generalized cerebral volume loss is present, with moderate predominantly supratentorial white matter changes, most consistent with chronic small vessel ischemic disease. NO ACUTE INTRACRANIAL PROCESS IDENTIFIED, WITHIN LIMITS OF THE STUDY. ATROPHIC AND INVOLUTIONAL CHANGES. Recent Labs     12/01/20 0925 12/02/20  0555   WBC 5.9 6.6   HGB 11.0* 11.7*    252     Recent Labs     12/01/20 0925 12/02/20  0555    141   K 3.6 3.5   * 108*   CO2 19* 17*   BUN 21 27*   CREATININE 1.07 1.12   GLUCOSE 63* 58*     No results for input(s): BILITOT, ALKPHOS, AST, ALT in the last 72 hours. Lab Results   Component Value Date    PROTIME 11.1 07/05/2019    INR 1.1 07/05/2019     No results found for: LITHIUM, DILFRTOT, VALPROATE    ASSESSMENT AND PLAN  Alzheimer's dementia with a slow decline. Witness account is still not available we tried to contact his son who is in [place] we were not able to reach him and we tried to find some friends who can give us more insight but no one can give us any insight to his progressive memory issues and how he functions. Safety issues other for concern and will relate this to social workers regarding placement. MRI of the brain was reviewed personally there appears to be some degree of atrophy with minor small vessel ischemic changes may explain his memory as well as gait issues. We will start him on Namenda now as he has had some sundowning and agitation this may help. We will continue to keep him under observation and treat his behaviors and sundowning which again fall into a neurological disorder.     Pt seen and he slept well  He is not concerned about being here and not sure why he is here      Awaiting psych evaluation for competency  Plan to DC to skilled nursing facility  PT/OT eval  EEG completed  TSH, B12 and folate within normal limits  Spoke with care coordinator and nursing. It appears patient is somewhat estranged from his family and baseline mental status is hard to determine at this time. I have personally performed a face to face diagnostic evaluation on this patient, reviewed all data and investigations, and am the sole provider of all clinical decisions on the neurological status of this patient. My exam shows a pleasant gentlement with less agitation,  We will continue Namenda and see how he does      Ian REKHA Diego MD, AYDIN  Diplomate, American Board of Psychiatry & Neurology  Board Certified in Vascular Neurology  Board Certified in Neuromuscular Medicine  Certified in [facility] 14 awaiting psych evaluation for competency and further recommendations  Discharge planning in progress